# Patient Record
Sex: FEMALE | Race: OTHER | NOT HISPANIC OR LATINO
[De-identification: names, ages, dates, MRNs, and addresses within clinical notes are randomized per-mention and may not be internally consistent; named-entity substitution may affect disease eponyms.]

---

## 2017-07-17 ENCOUNTER — RECORD ABSTRACTING (OUTPATIENT)
Age: 22
End: 2017-07-17

## 2017-07-17 PROBLEM — Z00.00 ENCOUNTER FOR PREVENTIVE HEALTH EXAMINATION: Status: ACTIVE | Noted: 2017-07-17

## 2018-02-06 ENCOUNTER — APPOINTMENT (OUTPATIENT)
Dept: ANTEPARTUM | Facility: CLINIC | Age: 23
End: 2018-02-06
Payer: MEDICAID

## 2018-02-06 ENCOUNTER — ASOB RESULT (OUTPATIENT)
Age: 23
End: 2018-02-06

## 2018-02-06 PROCEDURE — 76817 TRANSVAGINAL US OBSTETRIC: CPT

## 2018-02-06 PROCEDURE — 76801 OB US < 14 WKS SINGLE FETUS: CPT

## 2018-02-14 DIAGNOSIS — Z78.9 OTHER SPECIFIED HEALTH STATUS: ICD-10-CM

## 2018-02-14 DIAGNOSIS — A74.9 CHLAMYDIAL INFECTION, UNSPECIFIED: ICD-10-CM

## 2018-02-14 DIAGNOSIS — Z87.59 PERSONAL HISTORY OF OTHER COMPLICATIONS OF PREGNANCY, CHILDBIRTH AND THE PUERPERIUM: ICD-10-CM

## 2018-02-14 DIAGNOSIS — Z87.440 PERSONAL HISTORY OF URINARY (TRACT) INFECTIONS: ICD-10-CM

## 2018-02-16 ENCOUNTER — APPOINTMENT (OUTPATIENT)
Dept: MATERNAL FETAL MEDICINE | Facility: CLINIC | Age: 23
End: 2018-02-16
Payer: MEDICAID

## 2018-02-16 ENCOUNTER — APPOINTMENT (OUTPATIENT)
Dept: ANTEPARTUM | Facility: CLINIC | Age: 23
End: 2018-02-16

## 2018-02-16 VITALS
HEART RATE: 97 BPM | RESPIRATION RATE: 16 BRPM | SYSTOLIC BLOOD PRESSURE: 92 MMHG | HEIGHT: 62 IN | BODY MASS INDEX: 22.92 KG/M2 | DIASTOLIC BLOOD PRESSURE: 56 MMHG | WEIGHT: 124.56 LBS | OXYGEN SATURATION: 98 %

## 2018-02-16 DIAGNOSIS — Z87.440 SUPERVISION OF OTHER HIGH RISK PREGNANCIES, UNSPECIFIED TRIMESTER: ICD-10-CM

## 2018-02-16 DIAGNOSIS — O09.899 SUPERVISION OF OTHER HIGH RISK PREGNANCIES, UNSPECIFIED TRIMESTER: ICD-10-CM

## 2018-02-16 PROCEDURE — 99243 OFF/OP CNSLTJ NEW/EST LOW 30: CPT

## 2018-02-16 RX ORDER — KETOROLAC TROMETHAMINE 10 MG/1
10 TABLET, FILM COATED ORAL
Qty: 20 | Refills: 0 | Status: DISCONTINUED | COMMUNITY
Start: 2017-08-24

## 2018-02-19 LAB
ALBUMIN SERPL ELPH-MCNC: 4 G/DL
ALP BLD-CCNC: 59 U/L
ALT SERPL-CCNC: 22 U/L
ANION GAP SERPL CALC-SCNC: 12 MMOL/L
AST SERPL-CCNC: 33 U/L
BILIRUB SERPL-MCNC: <0.2 MG/DL
BUN SERPL-MCNC: 10 MG/DL
CALCIUM SERPL-MCNC: 9.7 MG/DL
CHLORIDE SERPL-SCNC: 103 MMOL/L
CO2 SERPL-SCNC: 23 MMOL/L
CREAT SERPL-MCNC: 0.76 MG/DL
GLUCOSE SERPL-MCNC: 124 MG/DL
HBA1C MFR BLD HPLC: 12.3 %
POTASSIUM SERPL-SCNC: 5 MMOL/L
PROT SERPL-MCNC: 6.8 G/DL
SODIUM SERPL-SCNC: 138 MMOL/L

## 2018-02-20 ENCOUNTER — APPOINTMENT (OUTPATIENT)
Dept: MATERNAL FETAL MEDICINE | Facility: CLINIC | Age: 23
End: 2018-02-20
Payer: MEDICAID

## 2018-02-20 ENCOUNTER — ASOB RESULT (OUTPATIENT)
Age: 23
End: 2018-02-20

## 2018-02-20 VITALS — HEIGHT: 62 IN | WEIGHT: 129 LBS | BODY MASS INDEX: 23.74 KG/M2

## 2018-02-20 LAB
CREAT SPEC-SCNC: 93 MG/DL
CREAT/PROT UR: 0.1 RATIO
PROT UR-MCNC: 5 MG/DL

## 2018-02-20 PROCEDURE — G0108 DIAB MANAGE TRN  PER INDIV: CPT

## 2018-02-20 RX ORDER — BLOOD SUGAR DIAGNOSTIC
STRIP MISCELLANEOUS
Qty: 100 | Refills: 0 | Status: DISCONTINUED | COMMUNITY
Start: 2018-02-05 | End: 2018-02-20

## 2018-02-23 LAB
CREAT 24H UR-MCNC: 1.3 G/24 H
CREAT 24H UR-MCNC: 1.3 G/24 H
CREAT ?TM UR-MCNC: 61 MG/DL
CREAT ?TM UR-MCNC: 61 MG/DL
PROT 24H UR-MRATE: <4 MG/DL
PROT ?TM UR-MCNC: 24 HR
PROT ?TM UR-MCNC: 24 HR
PROT UR-MCNC: NORMAL MG/24 H
SPECIMEN VOL 24H UR: 2050 ML
SPECIMEN VOL 24H UR: 2050 ML

## 2018-03-01 ENCOUNTER — APPOINTMENT (OUTPATIENT)
Dept: MATERNAL FETAL MEDICINE | Facility: CLINIC | Age: 23
End: 2018-03-01

## 2018-03-06 ENCOUNTER — APPOINTMENT (OUTPATIENT)
Dept: MATERNAL FETAL MEDICINE | Facility: CLINIC | Age: 23
End: 2018-03-06

## 2018-03-15 ENCOUNTER — APPOINTMENT (OUTPATIENT)
Dept: MATERNAL FETAL MEDICINE | Facility: CLINIC | Age: 23
End: 2018-03-15
Payer: MEDICAID

## 2018-03-15 ENCOUNTER — RX RENEWAL (OUTPATIENT)
Age: 23
End: 2018-03-15

## 2018-03-15 ENCOUNTER — ASOB RESULT (OUTPATIENT)
Age: 23
End: 2018-03-15

## 2018-03-15 ENCOUNTER — OTHER (OUTPATIENT)
Age: 23
End: 2018-03-15

## 2018-03-15 PROCEDURE — 99241 OFFICE CONSULTATION NEW/ESTAB PATIENT 15 MIN: CPT

## 2018-03-20 ENCOUNTER — ASOB RESULT (OUTPATIENT)
Age: 23
End: 2018-03-20

## 2018-03-20 ENCOUNTER — APPOINTMENT (OUTPATIENT)
Dept: ANTEPARTUM | Facility: CLINIC | Age: 23
End: 2018-03-20
Payer: MEDICAID

## 2018-03-20 PROCEDURE — 76801 OB US < 14 WKS SINGLE FETUS: CPT

## 2018-03-20 PROCEDURE — 36416 COLLJ CAPILLARY BLOOD SPEC: CPT

## 2018-03-20 PROCEDURE — 76813 OB US NUCHAL MEAS 1 GEST: CPT

## 2018-03-26 LAB
1ST TRIMESTER DATA: NORMAL
ADDENDUM DOC: NORMAL
AFP PNL SERPL: NORMAL
AFP SERPL-ACNC: NORMAL
CLINICAL BIOCHEMIST REVIEW: NORMAL
FREE BETA HCG 1ST TRIMESTER: NORMAL
Lab: NORMAL
NASAL BONE: PRESENT
NOTES NTD: NORMAL
NT: NORMAL
PAPP-A SERPL-ACNC: NORMAL
TRISOMY 18/3: NORMAL

## 2018-03-27 ENCOUNTER — ASOB RESULT (OUTPATIENT)
Age: 23
End: 2018-03-27

## 2018-03-27 ENCOUNTER — APPOINTMENT (OUTPATIENT)
Dept: MATERNAL FETAL MEDICINE | Facility: CLINIC | Age: 23
End: 2018-03-27

## 2018-04-03 ENCOUNTER — APPOINTMENT (OUTPATIENT)
Dept: ANTEPARTUM | Facility: CLINIC | Age: 23
End: 2018-04-03

## 2018-04-10 ENCOUNTER — APPOINTMENT (OUTPATIENT)
Dept: MATERNAL FETAL MEDICINE | Facility: CLINIC | Age: 23
End: 2018-04-10
Payer: MEDICAID

## 2018-04-10 ENCOUNTER — ASOB RESULT (OUTPATIENT)
Age: 23
End: 2018-04-10

## 2018-04-10 DIAGNOSIS — O24.011 PRE-EXISTING TYPE 1 DIABETES MELLITUS, IN PREGNANCY, FIRST TRIMESTER: ICD-10-CM

## 2018-04-10 PROCEDURE — G0108 DIAB MANAGE TRN  PER INDIV: CPT

## 2018-04-11 ENCOUNTER — ASOB RESULT (OUTPATIENT)
Age: 23
End: 2018-04-11

## 2018-04-11 VITALS — HEIGHT: 62 IN | WEIGHT: 142.9 LBS | BODY MASS INDEX: 26.3 KG/M2

## 2018-04-11 PROBLEM — O24.011 TYPE 1 DIABETES MELLITUS DURING PREGNANCY IN FIRST TRIMESTER: Noted: 2018-02-16

## 2018-04-17 ENCOUNTER — ASOB RESULT (OUTPATIENT)
Age: 23
End: 2018-04-17

## 2018-04-17 ENCOUNTER — APPOINTMENT (OUTPATIENT)
Dept: MATERNAL FETAL MEDICINE | Facility: CLINIC | Age: 23
End: 2018-04-17
Payer: MEDICAID

## 2018-04-17 VITALS — HEIGHT: 62 IN | BODY MASS INDEX: 26.87 KG/M2 | WEIGHT: 146 LBS

## 2018-04-17 PROCEDURE — G0108 DIAB MANAGE TRN  PER INDIV: CPT

## 2018-05-01 ENCOUNTER — APPOINTMENT (OUTPATIENT)
Dept: ANTEPARTUM | Facility: CLINIC | Age: 23
End: 2018-05-01
Payer: MEDICAID

## 2018-05-01 ENCOUNTER — APPOINTMENT (OUTPATIENT)
Dept: MATERNAL FETAL MEDICINE | Facility: CLINIC | Age: 23
End: 2018-05-01
Payer: MEDICAID

## 2018-05-01 ENCOUNTER — ASOB RESULT (OUTPATIENT)
Age: 23
End: 2018-05-01

## 2018-05-01 VITALS
SYSTOLIC BLOOD PRESSURE: 90 MMHG | DIASTOLIC BLOOD PRESSURE: 56 MMHG | OXYGEN SATURATION: 98 % | RESPIRATION RATE: 16 BRPM | WEIGHT: 147.5 LBS

## 2018-05-01 DIAGNOSIS — O99.89 OTHER SPECIFIED DISEASES AND CONDITIONS COMPLICATING PREGNANCY, CHILDBIRTH AND THE PUERPERIUM: ICD-10-CM

## 2018-05-01 DIAGNOSIS — Z22.330 OTHER SPECIFIED DISEASES AND CONDITIONS COMPLICATING PREGNANCY, CHILDBIRTH AND THE PUERPERIUM: ICD-10-CM

## 2018-05-01 PROCEDURE — 76811 OB US DETAILED SNGL FETUS: CPT

## 2018-05-01 PROCEDURE — 99213 OFFICE O/P EST LOW 20 MIN: CPT

## 2018-05-15 ENCOUNTER — ASOB RESULT (OUTPATIENT)
Age: 23
End: 2018-05-15

## 2018-05-15 ENCOUNTER — APPOINTMENT (OUTPATIENT)
Dept: MATERNAL FETAL MEDICINE | Facility: CLINIC | Age: 23
End: 2018-05-15
Payer: MEDICAID

## 2018-05-15 PROCEDURE — G0108 DIAB MANAGE TRN  PER INDIV: CPT

## 2018-05-16 LAB — HBA1C MFR BLD HPLC: 7.2 %

## 2018-05-18 ENCOUNTER — MEDICATION RENEWAL (OUTPATIENT)
Age: 23
End: 2018-05-18

## 2018-05-21 ENCOUNTER — APPOINTMENT (OUTPATIENT)
Dept: MATERNAL FETAL MEDICINE | Facility: CLINIC | Age: 23
End: 2018-05-21

## 2018-06-04 ENCOUNTER — APPOINTMENT (OUTPATIENT)
Dept: PEDIATRIC CARDIOLOGY | Facility: CLINIC | Age: 23
End: 2018-06-04
Payer: MEDICAID

## 2018-06-04 PROCEDURE — 99203 OFFICE O/P NEW LOW 30 MIN: CPT | Mod: 25

## 2018-06-04 PROCEDURE — 76820 UMBILICAL ARTERY ECHO: CPT

## 2018-06-04 PROCEDURE — 93325 DOPPLER ECHO COLOR FLOW MAPG: CPT | Mod: 59

## 2018-06-04 PROCEDURE — 76821 MIDDLE CEREBRAL ARTERY ECHO: CPT

## 2018-06-04 PROCEDURE — 76825 ECHO EXAM OF FETAL HEART: CPT

## 2018-06-04 PROCEDURE — 76827 ECHO EXAM OF FETAL HEART: CPT

## 2018-06-04 RX ORDER — PNV 119/IRON FUM/FOLIC ACID 29 MG-1 MG
TABLET ORAL
Qty: 30 | Refills: 0 | Status: ACTIVE | COMMUNITY
Start: 2018-03-15

## 2018-06-04 RX ORDER — METRONIDAZOLE 500 MG/1
500 TABLET ORAL
Qty: 14 | Refills: 0 | Status: COMPLETED | COMMUNITY
Start: 2018-03-24

## 2018-06-05 ENCOUNTER — APPOINTMENT (OUTPATIENT)
Dept: ANTEPARTUM | Facility: CLINIC | Age: 23
End: 2018-06-05
Payer: MEDICAID

## 2018-06-05 ENCOUNTER — ASOB RESULT (OUTPATIENT)
Age: 23
End: 2018-06-05

## 2018-06-05 ENCOUNTER — APPOINTMENT (OUTPATIENT)
Dept: MATERNAL FETAL MEDICINE | Facility: CLINIC | Age: 23
End: 2018-06-05
Payer: MEDICAID

## 2018-06-05 VITALS
WEIGHT: 156.31 LBS | SYSTOLIC BLOOD PRESSURE: 98 MMHG | BODY MASS INDEX: 28.76 KG/M2 | DIASTOLIC BLOOD PRESSURE: 56 MMHG | HEIGHT: 62 IN

## 2018-06-05 PROCEDURE — 76816 OB US FOLLOW-UP PER FETUS: CPT

## 2018-06-05 PROCEDURE — 99214 OFFICE O/P EST MOD 30 MIN: CPT

## 2018-06-06 ENCOUNTER — APPOINTMENT (OUTPATIENT)
Dept: MATERNAL FETAL MEDICINE | Facility: CLINIC | Age: 23
End: 2018-06-06

## 2018-06-29 ENCOUNTER — OTHER (OUTPATIENT)
Age: 23
End: 2018-06-29

## 2018-06-29 RX ORDER — ISOPROPYL ALCOHOL 70 ML/100ML
SWAB TOPICAL
Qty: 2 | Refills: 3 | Status: ACTIVE | COMMUNITY
Start: 2018-05-15 | End: 1900-01-01

## 2018-06-29 RX ORDER — LANCING DEVICE
EACH MISCELLANEOUS
Qty: 2 | Refills: 0 | Status: ACTIVE | COMMUNITY
Start: 2018-02-05 | End: 1900-01-01

## 2018-07-03 ENCOUNTER — ASOB RESULT (OUTPATIENT)
Age: 23
End: 2018-07-03

## 2018-07-03 ENCOUNTER — APPOINTMENT (OUTPATIENT)
Dept: ANTEPARTUM | Facility: CLINIC | Age: 23
End: 2018-07-03
Payer: MEDICAID

## 2018-07-03 ENCOUNTER — APPOINTMENT (OUTPATIENT)
Dept: MATERNAL FETAL MEDICINE | Facility: CLINIC | Age: 23
End: 2018-07-03
Payer: MEDICAID

## 2018-07-03 ENCOUNTER — APPOINTMENT (OUTPATIENT)
Dept: PEDIATRIC CARDIOLOGY | Facility: CLINIC | Age: 23
End: 2018-07-03
Payer: MEDICAID

## 2018-07-03 VITALS
HEIGHT: 62 IN | WEIGHT: 165 LBS | DIASTOLIC BLOOD PRESSURE: 62 MMHG | SYSTOLIC BLOOD PRESSURE: 100 MMHG | HEART RATE: 88 BPM | BODY MASS INDEX: 30.36 KG/M2

## 2018-07-03 DIAGNOSIS — O24.012 PRE-EXISTING TYPE 1 DIABETES MELLITUS, IN PREGNANCY, SECOND TRIMESTER: ICD-10-CM

## 2018-07-03 DIAGNOSIS — O35.9XX0 MATERNAL CARE FOR (SUSPECTED) FETAL ABNORMALITY AND DAMAGE, UNSPECIFIED, NOT APPLICABLE OR UNSPECIFIED: ICD-10-CM

## 2018-07-03 DIAGNOSIS — Z3A.08 8 WEEKS GESTATION OF PREGNANCY: ICD-10-CM

## 2018-07-03 DIAGNOSIS — Z3A.24 24 WEEKS GESTATION OF PREGNANCY: ICD-10-CM

## 2018-07-03 PROCEDURE — 76820 UMBILICAL ARTERY ECHO: CPT

## 2018-07-03 PROCEDURE — 93976 VASCULAR STUDY: CPT

## 2018-07-03 PROCEDURE — 93325 DOPPLER ECHO COLOR FLOW MAPG: CPT | Mod: 59

## 2018-07-03 PROCEDURE — 99214 OFFICE O/P EST MOD 30 MIN: CPT | Mod: 25

## 2018-07-03 PROCEDURE — 76816 OB US FOLLOW-UP PER FETUS: CPT

## 2018-07-03 PROCEDURE — 99213 OFFICE O/P EST LOW 20 MIN: CPT

## 2018-07-03 PROCEDURE — 76827 ECHO EXAM OF FETAL HEART: CPT

## 2018-07-03 PROCEDURE — 76825 ECHO EXAM OF FETAL HEART: CPT

## 2018-07-03 PROCEDURE — 76819 FETAL BIOPHYS PROFIL W/O NST: CPT

## 2018-07-03 PROCEDURE — 76821 MIDDLE CEREBRAL ARTERY ECHO: CPT

## 2018-07-03 RX ORDER — IBUPROFEN 400 MG/1
400 TABLET, FILM COATED ORAL
Qty: 21 | Refills: 0 | Status: DISCONTINUED | COMMUNITY
Start: 2018-06-14

## 2018-07-03 RX ORDER — AMOXICILLIN 500 MG/1
500 CAPSULE ORAL
Qty: 42 | Refills: 0 | Status: COMPLETED | COMMUNITY
Start: 2018-06-14

## 2018-07-26 ENCOUNTER — APPOINTMENT (OUTPATIENT)
Dept: MATERNAL FETAL MEDICINE | Facility: CLINIC | Age: 23
End: 2018-07-26

## 2018-07-26 ENCOUNTER — ASOB RESULT (OUTPATIENT)
Age: 23
End: 2018-07-26

## 2018-07-28 VITALS — WEIGHT: 172.06 LBS | BODY MASS INDEX: 31.66 KG/M2 | HEIGHT: 62 IN

## 2018-07-31 ENCOUNTER — ASOB RESULT (OUTPATIENT)
Age: 23
End: 2018-07-31

## 2018-07-31 ENCOUNTER — APPOINTMENT (OUTPATIENT)
Dept: ANTEPARTUM | Facility: CLINIC | Age: 23
End: 2018-07-31
Payer: MEDICAID

## 2018-07-31 PROCEDURE — 76816 OB US FOLLOW-UP PER FETUS: CPT

## 2018-07-31 PROCEDURE — 76819 FETAL BIOPHYS PROFIL W/O NST: CPT

## 2018-07-31 PROCEDURE — 93976 VASCULAR STUDY: CPT

## 2018-07-31 PROCEDURE — 76820 UMBILICAL ARTERY ECHO: CPT

## 2018-08-07 ENCOUNTER — APPOINTMENT (OUTPATIENT)
Dept: PEDIATRIC CARDIOLOGY | Facility: CLINIC | Age: 23
End: 2018-08-07

## 2018-08-10 ENCOUNTER — APPOINTMENT (OUTPATIENT)
Dept: MATERNAL FETAL MEDICINE | Facility: CLINIC | Age: 23
End: 2018-08-10
Payer: MEDICAID

## 2018-08-10 ENCOUNTER — APPOINTMENT (OUTPATIENT)
Dept: ANTEPARTUM | Facility: CLINIC | Age: 23
End: 2018-08-10
Payer: MEDICAID

## 2018-08-10 ENCOUNTER — ASOB RESULT (OUTPATIENT)
Age: 23
End: 2018-08-10

## 2018-08-10 DIAGNOSIS — Z3A.33 33 WEEKS GESTATION OF PREGNANCY: ICD-10-CM

## 2018-08-10 PROCEDURE — 99214 OFFICE O/P EST MOD 30 MIN: CPT

## 2018-08-10 PROCEDURE — 76815 OB US LIMITED FETUS(S): CPT

## 2018-08-10 PROCEDURE — 76818 FETAL BIOPHYS PROFILE W/NST: CPT

## 2018-08-10 RX ORDER — INSULIN LISPRO 100 [IU]/ML
100 INJECTION, SOLUTION INTRAVENOUS; SUBCUTANEOUS
Qty: 3 | Refills: 2 | Status: ACTIVE | COMMUNITY
Start: 2018-02-05

## 2018-08-13 ENCOUNTER — APPOINTMENT (OUTPATIENT)
Dept: ANTEPARTUM | Facility: CLINIC | Age: 23
End: 2018-08-13

## 2018-08-13 LAB — HBA1C MFR BLD HPLC: 8 %

## 2018-08-14 ENCOUNTER — APPOINTMENT (OUTPATIENT)
Dept: ANTEPARTUM | Facility: CLINIC | Age: 23
End: 2018-08-14
Payer: MEDICAID

## 2018-08-14 ENCOUNTER — ASOB RESULT (OUTPATIENT)
Age: 23
End: 2018-08-14

## 2018-08-14 PROCEDURE — 76818 FETAL BIOPHYS PROFILE W/NST: CPT

## 2018-08-14 PROCEDURE — 76815 OB US LIMITED FETUS(S): CPT

## 2018-08-16 ENCOUNTER — APPOINTMENT (OUTPATIENT)
Dept: MATERNAL FETAL MEDICINE | Facility: CLINIC | Age: 23
End: 2018-08-16

## 2018-08-16 ENCOUNTER — APPOINTMENT (OUTPATIENT)
Dept: ANTEPARTUM | Facility: CLINIC | Age: 23
End: 2018-08-16

## 2018-08-16 ENCOUNTER — ASOB RESULT (OUTPATIENT)
Age: 23
End: 2018-08-16

## 2018-08-16 ENCOUNTER — APPOINTMENT (OUTPATIENT)
Dept: ANTEPARTUM | Facility: CLINIC | Age: 23
End: 2018-08-16
Payer: MEDICAID

## 2018-08-16 DIAGNOSIS — Z3A.28 28 WEEKS GESTATION OF PREGNANCY: ICD-10-CM

## 2018-08-16 PROCEDURE — 93976 VASCULAR STUDY: CPT

## 2018-08-16 PROCEDURE — 76821 MIDDLE CEREBRAL ARTERY ECHO: CPT

## 2018-08-16 PROCEDURE — 76818 FETAL BIOPHYS PROFILE W/NST: CPT

## 2018-08-16 PROCEDURE — 76820 UMBILICAL ARTERY ECHO: CPT

## 2018-08-17 ENCOUNTER — APPOINTMENT (OUTPATIENT)
Dept: ANTEPARTUM | Facility: CLINIC | Age: 23
End: 2018-08-17

## 2018-08-17 ENCOUNTER — APPOINTMENT (OUTPATIENT)
Dept: PEDIATRIC CARDIOLOGY | Facility: CLINIC | Age: 23
End: 2018-08-17
Payer: MEDICAID

## 2018-08-17 DIAGNOSIS — O35.8XX0 MATERNAL CARE FOR OTHER (SUSPECTED) FETAL ABNORMALITY AND DAMAGE, NOT APPLICABLE OR UNSPECIFIED: ICD-10-CM

## 2018-08-17 DIAGNOSIS — Z3A.34 34 WEEKS GESTATION OF PREGNANCY: ICD-10-CM

## 2018-08-17 DIAGNOSIS — Z82.49 FAMILY HISTORY OF ISCHEMIC HEART DISEASE AND OTHER DISEASES OF THE CIRCULATORY SYSTEM: ICD-10-CM

## 2018-08-17 PROCEDURE — 76820 UMBILICAL ARTERY ECHO: CPT

## 2018-08-17 PROCEDURE — 76821 MIDDLE CEREBRAL ARTERY ECHO: CPT

## 2018-08-17 PROCEDURE — 76827 ECHO EXAM OF FETAL HEART: CPT

## 2018-08-17 PROCEDURE — 76825 ECHO EXAM OF FETAL HEART: CPT

## 2018-08-17 PROCEDURE — 93325 DOPPLER ECHO COLOR FLOW MAPG: CPT | Mod: 59

## 2018-08-17 PROCEDURE — 99214 OFFICE O/P EST MOD 30 MIN: CPT | Mod: 25

## 2018-08-21 ENCOUNTER — APPOINTMENT (OUTPATIENT)
Dept: MATERNAL FETAL MEDICINE | Facility: CLINIC | Age: 23
End: 2018-08-21
Payer: MEDICAID

## 2018-08-21 ENCOUNTER — ASOB RESULT (OUTPATIENT)
Age: 23
End: 2018-08-21

## 2018-08-21 ENCOUNTER — APPOINTMENT (OUTPATIENT)
Dept: ANTEPARTUM | Facility: CLINIC | Age: 23
End: 2018-08-21
Payer: MEDICAID

## 2018-08-21 VITALS — HEIGHT: 62 IN | BODY MASS INDEX: 33.58 KG/M2 | WEIGHT: 182.5 LBS

## 2018-08-21 DIAGNOSIS — E10.65 TYPE 1 DIABETES MELLITUS WITH HYPERGLYCEMIA: ICD-10-CM

## 2018-08-21 DIAGNOSIS — O24.013 PRE-EXISTING TYPE 1 DIABETES MELLITUS, IN PREGNANCY, THIRD TRIMESTER: ICD-10-CM

## 2018-08-21 PROCEDURE — 76815 OB US LIMITED FETUS(S): CPT | Mod: 59

## 2018-08-21 PROCEDURE — G0108 DIAB MANAGE TRN  PER INDIV: CPT

## 2018-08-21 PROCEDURE — 76820 UMBILICAL ARTERY ECHO: CPT

## 2018-08-21 PROCEDURE — 93976 VASCULAR STUDY: CPT

## 2018-08-21 PROCEDURE — 76821 MIDDLE CEREBRAL ARTERY ECHO: CPT

## 2018-08-21 PROCEDURE — 76818 FETAL BIOPHYS PROFILE W/NST: CPT

## 2018-08-24 ENCOUNTER — APPOINTMENT (OUTPATIENT)
Dept: ANTEPARTUM | Facility: CLINIC | Age: 23
End: 2018-08-24
Payer: MEDICAID

## 2018-08-24 ENCOUNTER — ASOB RESULT (OUTPATIENT)
Age: 23
End: 2018-08-24

## 2018-08-24 PROCEDURE — 93976 VASCULAR STUDY: CPT

## 2018-08-24 PROCEDURE — 76820 UMBILICAL ARTERY ECHO: CPT

## 2018-08-24 PROCEDURE — 76818 FETAL BIOPHYS PROFILE W/NST: CPT

## 2018-08-24 PROCEDURE — 76821 MIDDLE CEREBRAL ARTERY ECHO: CPT

## 2018-08-24 PROCEDURE — 76816 OB US FOLLOW-UP PER FETUS: CPT

## 2018-08-28 ENCOUNTER — APPOINTMENT (OUTPATIENT)
Dept: ANTEPARTUM | Facility: CLINIC | Age: 23
End: 2018-08-28
Payer: MEDICAID

## 2018-08-28 ENCOUNTER — ASOB RESULT (OUTPATIENT)
Age: 23
End: 2018-08-28

## 2018-08-28 PROCEDURE — 99214 OFFICE O/P EST MOD 30 MIN: CPT | Mod: 25,TH

## 2018-08-28 PROCEDURE — 76820 UMBILICAL ARTERY ECHO: CPT

## 2018-08-28 PROCEDURE — 76818 FETAL BIOPHYS PROFILE W/NST: CPT

## 2018-08-28 PROCEDURE — 93976 VASCULAR STUDY: CPT

## 2018-08-28 PROCEDURE — 76815 OB US LIMITED FETUS(S): CPT

## 2018-08-30 ENCOUNTER — INPATIENT (INPATIENT)
Facility: HOSPITAL | Age: 23
LOS: 6 days | Discharge: HOME | End: 2018-09-06
Attending: OBSTETRICS & GYNECOLOGY | Admitting: OBSTETRICS & GYNECOLOGY
Payer: COMMERCIAL

## 2018-08-30 ENCOUNTER — ASOB RESULT (OUTPATIENT)
Age: 23
End: 2018-08-30

## 2018-08-30 ENCOUNTER — APPOINTMENT (OUTPATIENT)
Dept: ANTEPARTUM | Facility: CLINIC | Age: 23
End: 2018-08-30

## 2018-08-30 ENCOUNTER — APPOINTMENT (OUTPATIENT)
Dept: ANTEPARTUM | Facility: CLINIC | Age: 23
End: 2018-08-30
Payer: MEDICAID

## 2018-08-30 VITALS — WEIGHT: 185.19 LBS | HEIGHT: 63 IN

## 2018-08-30 DIAGNOSIS — O47.03 FALSE LABOR BEFORE 37 COMPLETED WEEKS OF GESTATION, THIRD TRIMESTER: ICD-10-CM

## 2018-08-30 LAB
ABO RH CONFIRMATION: SIGNIFICANT CHANGE UP
ALBUMIN SERPL ELPH-MCNC: 3.2 G/DL — LOW (ref 3.3–5.2)
ALP SERPL-CCNC: 179 U/L — HIGH (ref 40–120)
ALT FLD-CCNC: 7 U/L — SIGNIFICANT CHANGE UP
AMPHET UR-MCNC: NEGATIVE — SIGNIFICANT CHANGE UP
ANION GAP SERPL CALC-SCNC: 13 MMOL/L — SIGNIFICANT CHANGE UP (ref 5–17)
ANISOCYTOSIS BLD QL: SLIGHT — SIGNIFICANT CHANGE UP
APPEARANCE UR: CLEAR — SIGNIFICANT CHANGE UP
AST SERPL-CCNC: 27 U/L — SIGNIFICANT CHANGE UP
BARBITURATES UR SCN-MCNC: NEGATIVE — SIGNIFICANT CHANGE UP
BASOPHILS # BLD AUTO: 0 K/UL — SIGNIFICANT CHANGE UP (ref 0–0.2)
BASOPHILS NFR BLD AUTO: 0.2 % — SIGNIFICANT CHANGE UP (ref 0–2)
BENZODIAZ UR-MCNC: NEGATIVE — SIGNIFICANT CHANGE UP
BILIRUB SERPL-MCNC: <0.2 MG/DL — LOW (ref 0.4–2)
BILIRUB UR-MCNC: NEGATIVE — SIGNIFICANT CHANGE UP
BLD GP AB SCN SERPL QL: SIGNIFICANT CHANGE UP
BUN SERPL-MCNC: 8 MG/DL — SIGNIFICANT CHANGE UP (ref 8–20)
CALCIUM SERPL-MCNC: 9.3 MG/DL — SIGNIFICANT CHANGE UP (ref 8.6–10.2)
CHLORIDE SERPL-SCNC: 105 MMOL/L — SIGNIFICANT CHANGE UP (ref 98–107)
CO2 SERPL-SCNC: 21 MMOL/L — LOW (ref 22–29)
COCAINE METAB.OTHER UR-MCNC: NEGATIVE — SIGNIFICANT CHANGE UP
COLOR SPEC: YELLOW — SIGNIFICANT CHANGE UP
CREAT SERPL-MCNC: 0.72 MG/DL — SIGNIFICANT CHANGE UP (ref 0.5–1.3)
DIFF PNL FLD: NEGATIVE — SIGNIFICANT CHANGE UP
EOSINOPHIL # BLD AUTO: 0.1 K/UL — SIGNIFICANT CHANGE UP (ref 0–0.5)
EOSINOPHIL NFR BLD AUTO: 0.8 % — SIGNIFICANT CHANGE UP (ref 0–6)
GLUCOSE BLDC GLUCOMTR-MCNC: 101 MG/DL — HIGH (ref 70–99)
GLUCOSE BLDC GLUCOMTR-MCNC: 117 MG/DL — HIGH (ref 70–99)
GLUCOSE BLDC GLUCOMTR-MCNC: 159 MG/DL — HIGH (ref 70–99)
GLUCOSE BLDC GLUCOMTR-MCNC: 200 MG/DL — HIGH (ref 70–99)
GLUCOSE BLDC GLUCOMTR-MCNC: 57 MG/DL — LOW (ref 70–99)
GLUCOSE SERPL-MCNC: 48 MG/DL — CRITICAL LOW (ref 70–115)
GLUCOSE UR QL: NEGATIVE MG/DL — SIGNIFICANT CHANGE UP
HCT VFR BLD CALC: 38.1 % — SIGNIFICANT CHANGE UP (ref 37–47)
HGB BLD-MCNC: 12.5 G/DL — SIGNIFICANT CHANGE UP (ref 12–16)
HIV 1 & 2 AB SERPL IA.RAPID: SIGNIFICANT CHANGE UP
KETONES UR-MCNC: NEGATIVE — SIGNIFICANT CHANGE UP
LEUKOCYTE ESTERASE UR-ACNC: NEGATIVE — SIGNIFICANT CHANGE UP
LYMPHOCYTES # BLD AUTO: 2.6 K/UL — SIGNIFICANT CHANGE UP (ref 1–4.8)
LYMPHOCYTES # BLD AUTO: 23.8 % — SIGNIFICANT CHANGE UP (ref 20–55)
MCHC RBC-ENTMCNC: 26.9 PG — LOW (ref 27–31)
MCHC RBC-ENTMCNC: 32.8 G/DL — SIGNIFICANT CHANGE UP (ref 32–36)
MCV RBC AUTO: 82.1 FL — SIGNIFICANT CHANGE UP (ref 81–99)
METHADONE UR-MCNC: NEGATIVE — SIGNIFICANT CHANGE UP
MONOCYTES # BLD AUTO: 0.7 K/UL — SIGNIFICANT CHANGE UP (ref 0–0.8)
MONOCYTES NFR BLD AUTO: 6.4 % — SIGNIFICANT CHANGE UP (ref 3–10)
NEUTROPHILS # BLD AUTO: 7.5 K/UL — SIGNIFICANT CHANGE UP (ref 1.8–8)
NEUTROPHILS NFR BLD AUTO: 67.5 % — SIGNIFICANT CHANGE UP (ref 37–73)
NITRITE UR-MCNC: NEGATIVE — SIGNIFICANT CHANGE UP
OPIATES UR-MCNC: NEGATIVE — SIGNIFICANT CHANGE UP
PCP SPEC-MCNC: SIGNIFICANT CHANGE UP
PCP UR-MCNC: NEGATIVE — SIGNIFICANT CHANGE UP
PH UR: 7 — SIGNIFICANT CHANGE UP (ref 5–8)
PLAT MORPH BLD: NORMAL — SIGNIFICANT CHANGE UP
PLATELET # BLD AUTO: 162 K/UL — SIGNIFICANT CHANGE UP (ref 150–400)
POIKILOCYTOSIS BLD QL AUTO: SLIGHT — SIGNIFICANT CHANGE UP
POTASSIUM SERPL-MCNC: 4.2 MMOL/L — SIGNIFICANT CHANGE UP (ref 3.5–5.3)
POTASSIUM SERPL-SCNC: 4.2 MMOL/L — SIGNIFICANT CHANGE UP (ref 3.5–5.3)
PROT SERPL-MCNC: 6.8 G/DL — SIGNIFICANT CHANGE UP (ref 6.6–8.7)
PROT UR-MCNC: NEGATIVE MG/DL — SIGNIFICANT CHANGE UP
RBC # BLD: 4.64 M/UL — SIGNIFICANT CHANGE UP (ref 4.4–5.2)
RBC # FLD: 13 % — SIGNIFICANT CHANGE UP (ref 11–15.6)
RBC BLD AUTO: ABNORMAL
SODIUM SERPL-SCNC: 139 MMOL/L — SIGNIFICANT CHANGE UP (ref 135–145)
SP GR SPEC: 1 — LOW (ref 1.01–1.02)
THC UR QL: NEGATIVE — SIGNIFICANT CHANGE UP
TYPE + AB SCN PNL BLD: SIGNIFICANT CHANGE UP
UROBILINOGEN FLD QL: NEGATIVE MG/DL — SIGNIFICANT CHANGE UP
WBC # BLD: 11.1 K/UL — HIGH (ref 4.8–10.8)
WBC # FLD AUTO: 11.1 K/UL — HIGH (ref 4.8–10.8)

## 2018-08-30 PROCEDURE — 76815 OB US LIMITED FETUS(S): CPT

## 2018-08-30 PROCEDURE — 76818 FETAL BIOPHYS PROFILE W/NST: CPT

## 2018-08-30 PROCEDURE — 93976 VASCULAR STUDY: CPT

## 2018-08-30 PROCEDURE — 76820 UMBILICAL ARTERY ECHO: CPT

## 2018-08-30 RX ORDER — HUMAN INSULIN 100 [IU]/ML
52 INJECTION, SUSPENSION SUBCUTANEOUS AT BEDTIME
Qty: 0 | Refills: 0 | Status: DISCONTINUED | OUTPATIENT
Start: 2018-08-30 | End: 2018-08-30

## 2018-08-30 RX ORDER — DEXTROSE 50 % IN WATER 50 %
15 SYRINGE (ML) INTRAVENOUS ONCE
Qty: 0 | Refills: 0 | Status: DISCONTINUED | OUTPATIENT
Start: 2018-08-30 | End: 2018-08-31

## 2018-08-30 RX ORDER — GLUCAGON INJECTION, SOLUTION 0.5 MG/.1ML
1 INJECTION, SOLUTION SUBCUTANEOUS ONCE
Qty: 0 | Refills: 0 | Status: DISCONTINUED | OUTPATIENT
Start: 2018-08-30 | End: 2018-09-06

## 2018-08-30 RX ORDER — SODIUM CHLORIDE 9 MG/ML
250 INJECTION, SOLUTION INTRAVENOUS
Qty: 0 | Refills: 0 | Status: COMPLETED | OUTPATIENT
Start: 2018-08-30 | End: 2018-08-30

## 2018-08-30 RX ORDER — SODIUM CHLORIDE 9 MG/ML
1000 INJECTION, SOLUTION INTRAVENOUS
Qty: 0 | Refills: 0 | Status: DISCONTINUED | OUTPATIENT
Start: 2018-08-30 | End: 2018-08-30

## 2018-08-30 RX ORDER — INSULIN LISPRO 100/ML
32 VIAL (ML) SUBCUTANEOUS
Qty: 0 | Refills: 0 | Status: DISCONTINUED | OUTPATIENT
Start: 2018-08-30 | End: 2018-08-30

## 2018-08-30 RX ORDER — INSULIN LISPRO 100/ML
VIAL (ML) SUBCUTANEOUS
Qty: 0 | Refills: 0 | Status: DISCONTINUED | OUTPATIENT
Start: 2018-08-30 | End: 2018-08-30

## 2018-08-30 RX ORDER — SODIUM CHLORIDE 9 MG/ML
1000 INJECTION, SOLUTION INTRAVENOUS
Qty: 0 | Refills: 0 | Status: DISCONTINUED | OUTPATIENT
Start: 2018-08-30 | End: 2018-09-06

## 2018-08-30 RX ORDER — DEXTROSE 50 % IN WATER 50 %
12.5 SYRINGE (ML) INTRAVENOUS ONCE
Qty: 0 | Refills: 0 | Status: DISCONTINUED | OUTPATIENT
Start: 2018-08-30 | End: 2018-09-06

## 2018-08-30 RX ORDER — INSULIN DETEMIR 100/ML (3)
40 INSULIN PEN (ML) SUBCUTANEOUS AT BEDTIME
Qty: 0 | Refills: 0 | Status: DISCONTINUED | OUTPATIENT
Start: 2018-08-30 | End: 2018-09-02

## 2018-08-30 RX ORDER — DEXTROSE 50 % IN WATER 50 %
25 SYRINGE (ML) INTRAVENOUS ONCE
Qty: 0 | Refills: 0 | Status: DISCONTINUED | OUTPATIENT
Start: 2018-08-30 | End: 2018-09-06

## 2018-08-30 RX ORDER — SODIUM CHLORIDE 9 MG/ML
500 INJECTION, SOLUTION INTRAVENOUS ONCE
Qty: 0 | Refills: 0 | Status: COMPLETED | OUTPATIENT
Start: 2018-08-30 | End: 2018-08-30

## 2018-08-30 RX ORDER — SODIUM CHLORIDE 9 MG/ML
1000 INJECTION, SOLUTION INTRAVENOUS
Qty: 0 | Refills: 0 | Status: DISCONTINUED | OUTPATIENT
Start: 2018-08-30 | End: 2018-08-31

## 2018-08-30 RX ORDER — INSULIN LISPRO 100/ML
VIAL (ML) SUBCUTANEOUS
Qty: 0 | Refills: 0 | Status: DISCONTINUED | OUTPATIENT
Start: 2018-08-30 | End: 2018-08-31

## 2018-08-30 RX ORDER — FOLIC ACID 0.8 MG
1 TABLET ORAL DAILY
Qty: 0 | Refills: 0 | Status: DISCONTINUED | OUTPATIENT
Start: 2018-08-30 | End: 2018-09-06

## 2018-08-30 RX ORDER — INSULIN LISPRO 100/ML
30 VIAL (ML) SUBCUTANEOUS
Qty: 0 | Refills: 0 | Status: DISCONTINUED | OUTPATIENT
Start: 2018-08-30 | End: 2018-09-03

## 2018-08-30 RX ADMIN — Medication 12 MILLIGRAM(S): at 12:08

## 2018-08-30 RX ADMIN — Medication 32 UNIT(S): at 13:08

## 2018-08-30 RX ADMIN — SODIUM CHLORIDE 1000 MILLILITER(S): 9 INJECTION, SOLUTION INTRAVENOUS at 11:30

## 2018-08-30 RX ADMIN — Medication 0.25 MILLIGRAM(S): at 12:08

## 2018-08-30 RX ADMIN — SODIUM CHLORIDE 500 MILLILITER(S): 9 INJECTION, SOLUTION INTRAVENOUS at 11:04

## 2018-08-30 RX ADMIN — Medication 2: at 18:43

## 2018-08-30 RX ADMIN — Medication 30 UNIT(S): at 17:25

## 2018-08-30 RX ADMIN — Medication 40 UNIT(S): at 22:50

## 2018-08-30 NOTE — ADVANCED PRACTICE NURSE CONSULT - ASSESSMENT
was to see pt by dr luther. pt is 36 weeks gestation type 1 diabetes. she was dx w type 1 3 years ago and was started on insulin. she was lorin w dr blackman. she was taking levemir 52 units qhs via insulin pen and  humalog 32 units +ss. she states she was having many low specialy in am. she did not remeber her last hemoglobin a1c. pt was educated about the effect of steroids on bg. pt and family verbalized understanding. also discussed healthy eating habits and portions and sugar free vs sugar. pt made adjustments for her meal accordingly.

## 2018-08-30 NOTE — DISCHARGE NOTE ANTEPARTUM - PATIENT PORTAL LINK FT
You can access the [facility] Patient Portal, offered by [facility], by registering with the following website: [email or web address]

## 2018-08-30 NOTE — ADVANCED PRACTICE NURSE CONSULT - RECOMMEDATIONS
continue diabetes self mnaagement education w pt and family  pls consider change nph to levemir 40 units qhs ( due to frequent severe hypos)  humalog 30 units before meals + ss 0-130 0 units                                                       131-150 4units                                                       151-170 2 units                                                       call md for bg >170  start hypoglycemia protocol @bg <60  bgm before meals and 1 hour after the begining of the meal

## 2018-08-31 DIAGNOSIS — Z3A.36 36 WEEKS GESTATION OF PREGNANCY: ICD-10-CM

## 2018-08-31 DIAGNOSIS — Z29.9 ENCOUNTER FOR PROPHYLACTIC MEASURES, UNSPECIFIED: ICD-10-CM

## 2018-08-31 DIAGNOSIS — O24.013 PRE-EXISTING TYPE 1 DIABETES MELLITUS, IN PREGNANCY, THIRD TRIMESTER: ICD-10-CM

## 2018-08-31 DIAGNOSIS — O35.8XX0 MATERNAL CARE FOR OTHER (SUSPECTED) FETAL ABNORMALITY AND DAMAGE, NOT APPLICABLE OR UNSPECIFIED: ICD-10-CM

## 2018-08-31 LAB
C TRACH RRNA SPEC QL NAA+PROBE: SIGNIFICANT CHANGE UP
GLUCOSE BLDC GLUCOMTR-MCNC: 101 MG/DL — HIGH (ref 70–99)
GLUCOSE BLDC GLUCOMTR-MCNC: 106 MG/DL — HIGH (ref 70–99)
GLUCOSE BLDC GLUCOMTR-MCNC: 107 MG/DL — HIGH (ref 70–99)
GLUCOSE BLDC GLUCOMTR-MCNC: 125 MG/DL — HIGH (ref 70–99)
GLUCOSE BLDC GLUCOMTR-MCNC: 139 MG/DL — HIGH (ref 70–99)
GLUCOSE BLDC GLUCOMTR-MCNC: 146 MG/DL — HIGH (ref 70–99)
GLUCOSE BLDC GLUCOMTR-MCNC: 170 MG/DL — HIGH (ref 70–99)
HBA1C BLD-MCNC: 7.7 % — HIGH (ref 4–5.6)
N GONORRHOEA RRNA SPEC QL NAA+PROBE: SIGNIFICANT CHANGE UP
SPECIMEN SOURCE: SIGNIFICANT CHANGE UP
T PALLIDUM AB TITR SER: NEGATIVE — SIGNIFICANT CHANGE UP

## 2018-08-31 RX ORDER — DEXTROSE 50 % IN WATER 50 %
25 SYRINGE (ML) INTRAVENOUS ONCE
Qty: 0 | Refills: 0 | Status: DISCONTINUED | OUTPATIENT
Start: 2018-08-31 | End: 2018-09-06

## 2018-08-31 RX ORDER — GLUCAGON INJECTION, SOLUTION 0.5 MG/.1ML
1 INJECTION, SOLUTION SUBCUTANEOUS ONCE
Qty: 0 | Refills: 0 | Status: DISCONTINUED | OUTPATIENT
Start: 2018-08-31 | End: 2018-09-06

## 2018-08-31 RX ORDER — SODIUM CHLORIDE 9 MG/ML
1000 INJECTION, SOLUTION INTRAVENOUS
Qty: 0 | Refills: 0 | Status: DISCONTINUED | OUTPATIENT
Start: 2018-08-31 | End: 2018-09-06

## 2018-08-31 RX ORDER — INSULIN LISPRO 100/ML
VIAL (ML) SUBCUTANEOUS
Qty: 0 | Refills: 0 | Status: DISCONTINUED | OUTPATIENT
Start: 2018-08-31 | End: 2018-08-31

## 2018-08-31 RX ORDER — INSULIN LISPRO 100/ML
VIAL (ML) SUBCUTANEOUS
Qty: 0 | Refills: 0 | Status: DISCONTINUED | OUTPATIENT
Start: 2018-08-31 | End: 2018-09-03

## 2018-08-31 RX ORDER — DEXTROSE 50 % IN WATER 50 %
15 SYRINGE (ML) INTRAVENOUS ONCE
Qty: 0 | Refills: 0 | Status: DISCONTINUED | OUTPATIENT
Start: 2018-08-31 | End: 2018-09-06

## 2018-08-31 RX ORDER — SODIUM CHLORIDE 9 MG/ML
3 INJECTION INTRAMUSCULAR; INTRAVENOUS; SUBCUTANEOUS EVERY 8 HOURS
Qty: 0 | Refills: 0 | Status: DISCONTINUED | OUTPATIENT
Start: 2018-08-31 | End: 2018-09-02

## 2018-08-31 RX ORDER — DEXTROSE 50 % IN WATER 50 %
12.5 SYRINGE (ML) INTRAVENOUS ONCE
Qty: 0 | Refills: 0 | Status: DISCONTINUED | OUTPATIENT
Start: 2018-08-31 | End: 2018-09-06

## 2018-08-31 RX ADMIN — Medication 30 UNIT(S): at 12:50

## 2018-08-31 RX ADMIN — Medication 1 MILLIGRAM(S): at 18:15

## 2018-08-31 RX ADMIN — Medication 12 MILLIGRAM(S): at 12:17

## 2018-08-31 RX ADMIN — Medication 30 UNIT(S): at 17:15

## 2018-08-31 RX ADMIN — Medication 1 TABLET(S): at 18:38

## 2018-08-31 RX ADMIN — Medication 30 UNIT(S): at 09:02

## 2018-08-31 RX ADMIN — Medication 40 UNIT(S): at 22:21

## 2018-08-31 NOTE — CONSULT NOTE ADULT - PROBLEM SELECTOR RECOMMENDATION 9
- continue NST  2 times per day  - pt given steroids to promote fetal lung maturity. Give second dose today. Agree with steroids to promote fetal lung maturity. Give second dose today. Continue close maternal and fetal surveillance. Neonatologist to speak with patient regarding  outcomes of early term deliveries

## 2018-08-31 NOTE — CONSULT NOTE ADULT - PROBLEM SELECTOR RECOMMENDATION 4
- SCD while in bed, encourage ambulation and oral hydration. - SCD while in bed, encourage ambulation and oral hydration

## 2018-08-31 NOTE — ADVANCED PRACTICE NURSE CONSULT - ASSESSMENT
return to see pt in am. pt is a+ox3bg is improved but not @ target. pt was encouraged to ambulate after meals to improve glycemic control. emotional support provided

## 2018-08-31 NOTE — CHART NOTE - NSCHARTNOTEFT_GEN_A_CORE
NST tracings reviewed with Dr. Conner. Tracing is reactive, patient not feeling contractions but toco reports contractions every 6-7 minutes. Patient is stable and can return to floor. NST tracings reviewed with Dr. Conner. Tracings reactive, patient not feeling contractions but toco reports contractions every 6-7 minutes. Patient is stable and can return to floor.

## 2018-08-31 NOTE — CONSULT NOTE ADULT - SUBJECTIVE AND OBJECTIVE BOX
IVY DE SANTIAGO 23y   CHRISSY 20 by 1st trimester US    Patient with Chief complaint of Patient is a 23y old  Female at 36 6/7 wks GA who presents with a chief complaint of poorly controlled glucose secondary to type 1 diabetes in pregnancy.  She was diagnosed by polyhydramnios earlier this pregnancy. Latest sono showed MADHAVI =20 cm yesterday. She was given betamethasone yesterday around noon for fetal lung maturation. She has no current complaints this morning. + FM, no LOF or VB.       REVIEW OF SYSTEMS:    CONSTITUTIONAL: No weakness, fevers or chills  EYES/ENT: No visual changes;  No vertigo or throat pain   NECK: No pain or stiffness  RESPIRATORY: No cough, wheezing, hemoptysis; No shortness of breath  CARDIOVASCULAR: No chest pain or palpitations  GASTROINTESTINAL: No abdominal or epigastric pain. No nausea, vomiting, or hematemesis; No diarrhea or constipation. No melena or hematochezia.  GENITOURINARY: No dysuria, frequency or hematuria  NEUROLOGICAL: No numbness or weakness  SKIN: No itching, burning, rashes, or lesions   All other review of systems is negative unless indicated above.  PAST MEDICAL & SURGICAL HISTORY:    betamethasone Injectable 12 milliGRAM(s) IntraMuscular every 24 hours  dextrose 40% Gel 15 Gram(s) Oral once PRN  dextrose 40% Gel 15 Gram(s) Oral once PRN  dextrose 5%. 1000 milliLiter(s) IV Continuous <Continuous>  dextrose 5%. 1000 milliLiter(s) IV Continuous <Continuous>  dextrose 50% Injectable 12.5 Gram(s) IV Push once  dextrose 50% Injectable 25 Gram(s) IV Push once  dextrose 50% Injectable 25 Gram(s) IV Push once  dextrose 50% Injectable 12.5 Gram(s) IV Push once  dextrose 50% Injectable 25 Gram(s) IV Push once  dextrose 50% Injectable 25 Gram(s) IV Push once  folic acid 1 milliGRAM(s) Oral daily  glucagon  Injectable 1 milliGRAM(s) IntraMuscular once PRN  glucagon  Injectable 1 milliGRAM(s) IntraMuscular once PRN  insulin detemir injectable (LEVEMIR) 40 Unit(s) SubCutaneous at bedtime  insulin lispro (HumaLOG) corrective regimen sliding scale   SubCutaneous three times a day with meals  insulin lispro (HumaLOG) corrective regimen sliding scale   SubCutaneous three times a day before meals  insulin lispro Injectable (HumaLOG) 30 Unit(s) SubCutaneous three times a day before meals  lactated ringers. 1000 milliLiter(s) IV Continuous <Continuous>  prenatal multivitamin 1 Tablet(s) Oral daily    Allergies: No Known Allergies      Social History: Denies ETOH, smoking and drugs.   POB/GYN Hx: 1 TOP, 1 SAB    Vital Signs:  Vital Signs Last 24 Hrs  T(C): 36.8 (31 Aug 2018 04:52), Max: 36.9 (30 Aug 2018 15:00)  T(F): 98.2 (31 Aug 2018 04:52), Max: 98.4 (30 Aug 2018 15:00)  HR: 87 (31 Aug 2018 04:52) (87 - 97)  BP: 137/87 (31 Aug 2018 04:52) (134/76 - 137/87)  RR: 18 (31 Aug 2018 04:52) (16 - 18)  SpO2: 98% (31 Aug 2018 04:52) (98% - 98%)    Physical Exam:  General: Adult female in NAD  Head/Neck: No neck masses, no lymphadenopathy  CVS: RRR, +S1/S2  Lungs: CTAB, no wheeze, ronchi or rales.   Breast: No tenderness or abnormal discharge.  Abdomen: +BS, soft, NT, gravid abdomen.   Pelvic: Deferred  Ext: No cyanosis, edema or calf tenderness.   Skin: No rashes, or lesions  Neuro: Normal DTRs, grossly intact    Labs:                          12.5   11.1  )-----------( 162      ( 30 Aug 2018 11:53 )             38.1         139  |  105  |  8.0  ----------------------------<  48<LL>  4.2   |  21.0<L>  |  0.72    Ca    9.3      30 Aug 2018 11:53    TPro  6.8  /  Alb  3.2<L>  /  TBili  <0.2<L>  /  DBili  x   /  AST  27  /  ALT  7   /  AlkPhos  179<H>      CAPILLARY BLOOD GLUCOSE      POCT Blood Glucose.: 117 mg/dL (30 Aug 2018 22:48)  POCT Blood Glucose.: 200 mg/dL (30 Aug 2018 18:39)  POCT Blood Glucose.: 159 mg/dL (30 Aug 2018 17:14)  POCT Blood Glucose.: 101 mg/dL (30 Aug 2018 13:03)  POCT Blood Glucose.: 57 mg/dL (30 Aug 2018 10:40)          MEDICATIONS  (STANDING):  betamethasone Injectable 12 milliGRAM(s) IntraMuscular every 24 hours  dextrose 5%. 1000 milliLiter(s) (50 mL/Hr) IV Continuous <Continuous>  dextrose 5%. 1000 milliLiter(s) (50 mL/Hr) IV Continuous <Continuous>  dextrose 50% Injectable 12.5 Gram(s) IV Push once  dextrose 50% Injectable 25 Gram(s) IV Push once  dextrose 50% Injectable 25 Gram(s) IV Push once  dextrose 50% Injectable 12.5 Gram(s) IV Push once  dextrose 50% Injectable 25 Gram(s) IV Push once  dextrose 50% Injectable 25 Gram(s) IV Push once  folic acid 1 milliGRAM(s) Oral daily  insulin detemir injectable (LEVEMIR) 40 Unit(s) SubCutaneous at bedtime  insulin lispro (HumaLOG) corrective regimen sliding scale   SubCutaneous three times a day with meals  insulin lispro (HumaLOG) corrective regimen sliding scale   SubCutaneous three times a day before meals  insulin lispro Injectable (HumaLOG) 30 Unit(s) SubCutaneous three times a day before meals  lactated ringers. 1000 milliLiter(s) (125 mL/Hr) IV Continuous <Continuous>  prenatal multivitamin 1 Tablet(s) Oral daily    MEDICATIONS  (PRN):  dextrose 40% Gel 15 Gram(s) Oral once PRN Blood Glucose LESS THAN 70 milliGRAM(s)/deciliter  dextrose 40% Gel 15 Gram(s) Oral once PRN Blood Glucose LESS THAN 70 milliGRAM(s)/deciliter  glucagon  Injectable 1 milliGRAM(s) IntraMuscular once PRN Glucose LESS THAN 70 milligrams/deciliter  glucagon  Injectable 1 milliGRAM(s) IntraMuscular once PRN Glucose LESS THAN 70 milligrams/deciliter IVY DE SANTIAGO 22yo   CHRISSY 20 by 1st trimester US    Patient is a 23 year old Female at 36 6/7 wks GA who presents with a chief complaint of poorly controlled glucose. She has type 1 diabetes during pregnancy that is being treated with insulin.  She was diagnosed with polyhydramnios earlier during this pregnancy. The latest sonogram done yesterday reported the MADHAVI = 20 cm. She was given steroids (betamethasone) yesterday around noon to promote fetal lung maturation. She has no current complaints. + FM, no LOF or vaginal bleeding. Fetus has been diagnosed with a ventricular septal defect.      REVIEW OF SYSTEMS:  CONSTITUTIONAL: No weakness, fevers or chills  EYES/ENT: No visual changes;  No vertigo or throat pain   NECK: No pain or stiffness  RESPIRATORY: No cough, wheezing, hemoptysis; No shortness of breath  CARDIOVASCULAR: No chest pain or palpitations  GASTROINTESTINAL: No abdominal or epigastric pain. No nausea, vomiting, or hematemesis; No diarrhea or constipation. No melena or hematochezia.  GENITOURINARY: No dysuria, frequency or hematuria  NEUROLOGICAL: No numbness or weakness  SKIN: No itching, burning, rashes, or lesions   All other review of systems is negative unless indicated above.  PAST MEDICAL & SURGICAL HISTORY:    Allergies: No Known Allergies    Social History: Denies ETOH, smoking and drugs.     Past OB/GYN Hx: 1 TOP, 1 SAB    Family History: Mother with hypertension, Father with CAD    Vital Signs:  Vital Signs Last 24 Hrs  T(C): 36.8 (31 Aug 2018 04:52), Max: 36.9 (30 Aug 2018 15:00)  T(F): 98.2 (31 Aug 2018 04:52), Max: 98.4 (30 Aug 2018 15:00)  HR: 87 (31 Aug 2018 04:52) (87 - 97)  BP: 137/87 (31 Aug 2018 04:52) (134/76 - 137/87)  RR: 18 (31 Aug 2018 04:52) (16 - 18)  SpO2: 98% (31 Aug 2018 04:52) (98% - 98%)    Physical Exam:  General: Adult female in NAD  Head/Neck: No neck masses, no lymphadenopathy  CVS: RRR, +S1/S2  Lungs: CTAB, no wheeze, ronchi or rales.   Breast: No tenderness or abnormal discharge.  Abdomen: +BS, soft, NT, gravid abdomen.   Pelvic: Deferred  Ext: No cyanosis, edema or calf tenderness.   Skin: No rashes, or lesions  Neuro: Normal DTRs, grossly intact    Labs:                        12.5   11.1  )-----------( 162      ( 30 Aug 2018 11:53 )             38.1         139  |  105  |  8.0  ----------------------------<  48<LL>  4.2   |  21.0<L>  |  0.72    Ca    9.3      30 Aug 2018 11:53    TPro  6.8  /  Alb  3.2<L>  /  TBili  <0.2<L>  /  DBili  x   /  AST  27  /  ALT  7   /  AlkPhos  179<H>      CAPILLARY BLOOD GLUCOSE  POCT Blood Glucose.: 117 mg/dL (30 Aug 2018 22:48)  POCT Blood Glucose.: 200 mg/dL (30 Aug 2018 18:39)  POCT Blood Glucose.: 159 mg/dL (30 Aug 2018 17:14)  POCT Blood Glucose.: 101 mg/dL (30 Aug 2018 13:03)  POCT Blood Glucose.: 57 mg/dL (30 Aug 2018 10:40)    MEDICATIONS  (STANDING):  betamethasone Injectable 12 milliGRAM(s) IntraMuscular every 24 hours  dextrose 5%. 1000 milliLiter(s) (50 mL/Hr) IV Continuous <Continuous>  dextrose 5%. 1000 milliLiter(s) (50 mL/Hr) IV Continuous <Continuous>  dextrose 50% Injectable 12.5 Gram(s) IV Push once  dextrose 50% Injectable 25 Gram(s) IV Push once  dextrose 50% Injectable 25 Gram(s) IV Push once  dextrose 50% Injectable 12.5 Gram(s) IV Push once  dextrose 50% Injectable 25 Gram(s) IV Push once  dextrose 50% Injectable 25 Gram(s) IV Push once  folic acid 1 milliGRAM(s) Oral daily  insulin detemir injectable (LEVEMIR) 40 Unit(s) SubCutaneous at bedtime  insulin lispro (HumaLOG) corrective regimen sliding scale   SubCutaneous three times a day with meals  insulin lispro (HumaLOG) corrective regimen sliding scale   SubCutaneous three times a day before meals  insulin lispro Injectable (HumaLOG) 30 Unit(s) SubCutaneous three times a day before meals  lactated ringers. 1000 milliLiter(s) (125 mL/Hr) IV Continuous <Continuous>  prenatal multivitamin 1 Tablet(s) Oral daily    MEDICATIONS  (PRN):  dextrose 40% Gel 15 Gram(s) Oral once PRN Blood Glucose LESS THAN 70 milliGRAM(s)/deciliter  dextrose 40% Gel 15 Gram(s) Oral once PRN Blood Glucose LESS THAN 70 milliGRAM(s)/deciliter  glucagon  Injectable 1 milliGRAM(s) IntraMuscular once PRN Glucose LESS THAN 70 milligrams/deciliter  glucagon  Injectable 1 milliGRAM(s) IntraMuscular once PRN Glucose LESS THAN 70 milligrams/deciliter

## 2018-08-31 NOTE — ADVANCED PRACTICE NURSE CONSULT - RECOMMEDATIONS
continue diabetes self management education  encourage pt to ambulate after meals to promote euglycemic levels

## 2018-08-31 NOTE — CONSULT NOTE ADULT - PROBLEM SELECTOR RECOMMENDATION 2
- poor glycemic control due to non-adherence to diet and insulin regimen as outpatient. Hospitalized currently to optimize glycemic control prior to induction of labor at 37 weeks due to increased risk of still birth.   - Continue current insuline regimen and add sliding scale to pre-meal humalog The poor glycemic control can be attributed to non-adherence to diet and insulin regimen as outpatient. Hospitalized currently to optimize glycemic control prior to induction of labor at 37 weeks due to increased risk of stillbirth. Diabetes educator to see patient during hospitalization. Neonatologist to speak with patient regarding  outcomes of pregnancies complicated by diabetes.  Continue adjusting current insulin regimen and add sliding scale before and after meals

## 2018-08-31 NOTE — CONSULT NOTE ADULT - ASSESSMENT
24 yo  here at 36.6 wks GA with Type 1 diabetes with poor glycemic control. She has continued to have sub optimal glycemic control over the last 12 hours during the hospital. Fetus is known to have congenital heart disease (VSD). 22 yo  at 36 weeks and 6 days GA with Type 1 diabetes with poor glycemic control. She has continued to have suboptimal glycemic control over the last 12 hours. Fetus is known to have congenital heart disease (VSD).

## 2018-09-01 DIAGNOSIS — Z3A.37 37 WEEKS GESTATION OF PREGNANCY: ICD-10-CM

## 2018-09-01 LAB
CULTURE RESULTS: SIGNIFICANT CHANGE UP
CULTURE RESULTS: SIGNIFICANT CHANGE UP
GLUCOSE BLDC GLUCOMTR-MCNC: 138 MG/DL — HIGH (ref 70–99)
GLUCOSE BLDC GLUCOMTR-MCNC: 171 MG/DL — HIGH (ref 70–99)
GLUCOSE BLDC GLUCOMTR-MCNC: 179 MG/DL — HIGH (ref 70–99)
GLUCOSE BLDC GLUCOMTR-MCNC: 193 MG/DL — HIGH (ref 70–99)
GLUCOSE BLDC GLUCOMTR-MCNC: 244 MG/DL — HIGH (ref 70–99)
GLUCOSE BLDC GLUCOMTR-MCNC: 245 MG/DL — HIGH (ref 70–99)
GLUCOSE BLDC GLUCOMTR-MCNC: 51 MG/DL — LOW (ref 70–99)
GLUCOSE BLDC GLUCOMTR-MCNC: 96 MG/DL — SIGNIFICANT CHANGE UP (ref 70–99)
SPECIMEN SOURCE: SIGNIFICANT CHANGE UP
SPECIMEN SOURCE: SIGNIFICANT CHANGE UP

## 2018-09-01 PROCEDURE — 59025 FETAL NON-STRESS TEST: CPT | Mod: 26

## 2018-09-01 RX ORDER — DEXTROSE 50 % IN WATER 50 %
15 SYRINGE (ML) INTRAVENOUS ONCE
Qty: 0 | Refills: 0 | Status: COMPLETED | OUTPATIENT
Start: 2018-09-01 | End: 2018-09-01

## 2018-09-01 RX ADMIN — Medication 15 GRAM(S): at 22:37

## 2018-09-01 RX ADMIN — Medication 30 UNIT(S): at 12:55

## 2018-09-01 RX ADMIN — Medication 40 UNIT(S): at 23:15

## 2018-09-01 RX ADMIN — Medication 30 UNIT(S): at 17:15

## 2018-09-01 RX ADMIN — SODIUM CHLORIDE 3 MILLILITER(S): 9 INJECTION INTRAMUSCULAR; INTRAVENOUS; SUBCUTANEOUS at 23:05

## 2018-09-01 RX ADMIN — Medication: at 08:46

## 2018-09-01 RX ADMIN — Medication 1 TABLET(S): at 12:55

## 2018-09-01 RX ADMIN — Medication 8: at 12:54

## 2018-09-01 RX ADMIN — SODIUM CHLORIDE 3 MILLILITER(S): 9 INJECTION INTRAMUSCULAR; INTRAVENOUS; SUBCUTANEOUS at 14:15

## 2018-09-01 RX ADMIN — Medication 30 UNIT(S): at 08:46

## 2018-09-01 RX ADMIN — Medication 1 MILLIGRAM(S): at 12:51

## 2018-09-01 RX ADMIN — Medication 8: at 09:00

## 2018-09-01 NOTE — PROGRESS NOTE ADULT - ASSESSMENT
23y   CHRISSY 18 by 1st trimester US at 37 0/7 wks GA with type 1 diabetes here for glycemic optimization prior to IOL. Glycemic control was better yesterday. No episodes of hypoglycemia recorded or experienced. 1 hr post dinner was high, and her pre-meals are slightly higher than ideal. Discuss changing pre-meal insulin.

## 2018-09-01 NOTE — PROGRESS NOTE ADULT - SUBJECTIVE AND OBJECTIVE BOX
A 23y   CHRISSY 18 by 1st trimester US at 37 0/7 wks GA with type 1 diabetes, poorly controlled, here for glucose control and induction of labor. She is s/p betamethasone course.     No issues overnight. + FM, no LOF/VB. No calf pain or SOB. Pt reports feeling more pressure in her pelvis when walking than prior. Denies feeling like she was hypoglycemic yesterday at all.     T(F): 98.4 (18 @ 20:05), Max: 98.5 (18 @ 08:24)  HR: 100 (18 @ 20:05) (88 - 100)  BP: 133/74 (18 @ 20:05) (115/74 - 133/74)  RR: 20 (18 @ 20:05) (18 - 20)  SpO2: --    18 @ 07:01  -  18 @ 07:00  --------------------------------------------------------  IN: 250 mL / OUT: 0 mL / NET: 250 mL          PE: Well nourished and sleeping, rousable   Pulm: CTABL, speaking in full sentences   Abd: soft non-tender, gravid, no contractions palpated   Ext: trace edema bilaterally, no pitting, neg homans        Lab:   CAPILLARY BLOOD GLUCOSE      POCT Blood Glucose.: 107 mg/dL (31 Aug 2018 21:54)  POCT Blood Glucose.: 146 mg/dL (31 Aug 2018 18:11)  POCT Blood Glucose.: 125 mg/dL (31 Aug 2018 17:13)  POCT Blood Glucose.: 106 mg/dL (31 Aug 2018 14:09)  POCT Blood Glucose.: 101 mg/dL (31 Aug 2018 12:37)  POCT Blood Glucose.: 170 mg/dL (31 Aug 2018 10:12)  POCT Blood Glucose.: 139 mg/dL (31 Aug 2018 08:34)

## 2018-09-01 NOTE — CHART NOTE - NSCHARTNOTEFT_GEN_A_CORE
Nurse called OBGYN resident regarding postpradial FS of 244 after lunch. ISS was unclear regarding how much insulin to give at this point. History and FS values were reviewed with MFM on call. Dr. Chan recommends 10 units of Humulog and close monitoring. Patient is currently on 40units Levemir qhs and 30 units of Humulog before every meal with ISS. Patient is asymptomatic.

## 2018-09-02 ENCOUNTER — TRANSCRIPTION ENCOUNTER (OUTPATIENT)
Age: 23
End: 2018-09-02

## 2018-09-02 LAB
ALBUMIN SERPL ELPH-MCNC: 3.3 G/DL — SIGNIFICANT CHANGE UP (ref 3.3–5.2)
ALBUMIN SERPL ELPH-MCNC: 3.4 G/DL — SIGNIFICANT CHANGE UP (ref 3.3–5.2)
ALP SERPL-CCNC: 185 U/L — HIGH (ref 40–120)
ALP SERPL-CCNC: 188 U/L — HIGH (ref 40–120)
ALT FLD-CCNC: 51 U/L — HIGH
ALT FLD-CCNC: 60 U/L — HIGH
ANION GAP SERPL CALC-SCNC: 14 MMOL/L — SIGNIFICANT CHANGE UP (ref 5–17)
ANION GAP SERPL CALC-SCNC: 15 MMOL/L — SIGNIFICANT CHANGE UP (ref 5–17)
APTT BLD: 22.1 SEC — LOW (ref 27.5–37.4)
APTT BLD: 23.8 SEC — LOW (ref 27.5–37.4)
AST SERPL-CCNC: 129 U/L — HIGH
AST SERPL-CCNC: 142 U/L — HIGH
BASOPHILS # BLD AUTO: 0 K/UL — SIGNIFICANT CHANGE UP (ref 0–0.2)
BASOPHILS # BLD AUTO: 0 K/UL — SIGNIFICANT CHANGE UP (ref 0–0.2)
BASOPHILS NFR BLD AUTO: 0.2 % — SIGNIFICANT CHANGE UP (ref 0–2)
BASOPHILS NFR BLD AUTO: 0.2 % — SIGNIFICANT CHANGE UP (ref 0–2)
BILIRUB SERPL-MCNC: <0.2 MG/DL — LOW (ref 0.4–2)
BILIRUB SERPL-MCNC: <0.2 MG/DL — LOW (ref 0.4–2)
BUN SERPL-MCNC: 10 MG/DL — SIGNIFICANT CHANGE UP (ref 8–20)
BUN SERPL-MCNC: 13 MG/DL — SIGNIFICANT CHANGE UP (ref 8–20)
CALCIUM SERPL-MCNC: 8.6 MG/DL — SIGNIFICANT CHANGE UP (ref 8.6–10.2)
CALCIUM SERPL-MCNC: 9.1 MG/DL — SIGNIFICANT CHANGE UP (ref 8.6–10.2)
CHLORIDE SERPL-SCNC: 103 MMOL/L — SIGNIFICANT CHANGE UP (ref 98–107)
CHLORIDE SERPL-SCNC: 97 MMOL/L — LOW (ref 98–107)
CO2 SERPL-SCNC: 20 MMOL/L — LOW (ref 22–29)
CO2 SERPL-SCNC: 22 MMOL/L — SIGNIFICANT CHANGE UP (ref 22–29)
CREAT ?TM UR-MCNC: 28 MG/DL — SIGNIFICANT CHANGE UP
CREAT ?TM UR-MCNC: 47 MG/DL — SIGNIFICANT CHANGE UP
CREAT SERPL-MCNC: 0.66 MG/DL — SIGNIFICANT CHANGE UP (ref 0.5–1.3)
CREAT SERPL-MCNC: 0.8 MG/DL — SIGNIFICANT CHANGE UP (ref 0.5–1.3)
EOSINOPHIL # BLD AUTO: 0 K/UL — SIGNIFICANT CHANGE UP (ref 0–0.5)
EOSINOPHIL # BLD AUTO: 0 K/UL — SIGNIFICANT CHANGE UP (ref 0–0.5)
EOSINOPHIL NFR BLD AUTO: 0.2 % — SIGNIFICANT CHANGE UP (ref 0–6)
EOSINOPHIL NFR BLD AUTO: 0.3 % — SIGNIFICANT CHANGE UP (ref 0–6)
FIBRINOGEN PPP-MCNC: 592 MG/DL — HIGH (ref 310–510)
GLUCOSE BLDC GLUCOMTR-MCNC: 112 MG/DL — HIGH (ref 70–99)
GLUCOSE BLDC GLUCOMTR-MCNC: 117 MG/DL — HIGH (ref 70–99)
GLUCOSE BLDC GLUCOMTR-MCNC: 160 MG/DL — HIGH (ref 70–99)
GLUCOSE BLDC GLUCOMTR-MCNC: 234 MG/DL — HIGH (ref 70–99)
GLUCOSE BLDC GLUCOMTR-MCNC: 62 MG/DL — LOW (ref 70–99)
GLUCOSE BLDC GLUCOMTR-MCNC: 86 MG/DL — SIGNIFICANT CHANGE UP (ref 70–99)
GLUCOSE BLDC GLUCOMTR-MCNC: 91 MG/DL — SIGNIFICANT CHANGE UP (ref 70–99)
GLUCOSE SERPL-MCNC: 165 MG/DL — HIGH (ref 70–115)
GLUCOSE SERPL-MCNC: 93 MG/DL — SIGNIFICANT CHANGE UP (ref 70–115)
HCT VFR BLD CALC: 37.7 % — SIGNIFICANT CHANGE UP (ref 37–47)
HCT VFR BLD CALC: 37.8 % — SIGNIFICANT CHANGE UP (ref 37–47)
HGB BLD-MCNC: 12.3 G/DL — SIGNIFICANT CHANGE UP (ref 12–16)
HGB BLD-MCNC: 12.3 G/DL — SIGNIFICANT CHANGE UP (ref 12–16)
INR BLD: 0.95 RATIO — SIGNIFICANT CHANGE UP (ref 0.88–1.16)
INR BLD: 0.98 RATIO — SIGNIFICANT CHANGE UP (ref 0.88–1.16)
LDH SERPL L TO P-CCNC: 327 U/L — HIGH (ref 98–192)
LYMPHOCYTES # BLD AUTO: 1.9 K/UL — SIGNIFICANT CHANGE UP (ref 1–4.8)
LYMPHOCYTES # BLD AUTO: 1.9 K/UL — SIGNIFICANT CHANGE UP (ref 1–4.8)
LYMPHOCYTES # BLD AUTO: 14.8 % — LOW (ref 20–55)
LYMPHOCYTES # BLD AUTO: 16.1 % — LOW (ref 20–55)
MAGNESIUM SERPL-MCNC: 5.6 MG/DL — HIGH (ref 1.6–2.6)
MCHC RBC-ENTMCNC: 26.6 PG — LOW (ref 27–31)
MCHC RBC-ENTMCNC: 27 PG — SIGNIFICANT CHANGE UP (ref 27–31)
MCHC RBC-ENTMCNC: 32.5 G/DL — SIGNIFICANT CHANGE UP (ref 32–36)
MCHC RBC-ENTMCNC: 32.6 G/DL — SIGNIFICANT CHANGE UP (ref 32–36)
MCV RBC AUTO: 81.6 FL — SIGNIFICANT CHANGE UP (ref 81–99)
MCV RBC AUTO: 83.1 FL — SIGNIFICANT CHANGE UP (ref 81–99)
MONOCYTES # BLD AUTO: 1.1 K/UL — HIGH (ref 0–0.8)
MONOCYTES # BLD AUTO: 1.2 K/UL — HIGH (ref 0–0.8)
MONOCYTES NFR BLD AUTO: 9.6 % — SIGNIFICANT CHANGE UP (ref 3–10)
MONOCYTES NFR BLD AUTO: 9.7 % — SIGNIFICANT CHANGE UP (ref 3–10)
NEUTROPHILS # BLD AUTO: 8.1 K/UL — HIGH (ref 1.8–8)
NEUTROPHILS # BLD AUTO: 9.3 K/UL — HIGH (ref 1.8–8)
NEUTROPHILS NFR BLD AUTO: 70.2 % — SIGNIFICANT CHANGE UP (ref 37–73)
NEUTROPHILS NFR BLD AUTO: 72.8 % — SIGNIFICANT CHANGE UP (ref 37–73)
PLATELET # BLD AUTO: 150 K/UL — SIGNIFICANT CHANGE UP (ref 150–400)
PLATELET # BLD AUTO: 158 K/UL — SIGNIFICANT CHANGE UP (ref 150–400)
POTASSIUM SERPL-MCNC: 4 MMOL/L — SIGNIFICANT CHANGE UP (ref 3.5–5.3)
POTASSIUM SERPL-MCNC: 4.1 MMOL/L — SIGNIFICANT CHANGE UP (ref 3.5–5.3)
POTASSIUM SERPL-SCNC: 4 MMOL/L — SIGNIFICANT CHANGE UP (ref 3.5–5.3)
POTASSIUM SERPL-SCNC: 4.1 MMOL/L — SIGNIFICANT CHANGE UP (ref 3.5–5.3)
PROT ?TM UR-MCNC: 11 MG/DL — SIGNIFICANT CHANGE UP (ref 0–12)
PROT ?TM UR-MCNC: 6 MG/DL — SIGNIFICANT CHANGE UP (ref 0–12)
PROT SERPL-MCNC: 6.6 G/DL — SIGNIFICANT CHANGE UP (ref 6.6–8.7)
PROT SERPL-MCNC: 6.6 G/DL — SIGNIFICANT CHANGE UP (ref 6.6–8.7)
PROT/CREAT UR-RTO: 0.1 RATIO — SIGNIFICANT CHANGE UP
PROT/CREAT UR-RTO: 0.4 RATIO — HIGH
PROTHROM AB SERPL-ACNC: 10.4 SEC — SIGNIFICANT CHANGE UP (ref 9.8–12.7)
PROTHROM AB SERPL-ACNC: 10.8 SEC — SIGNIFICANT CHANGE UP (ref 9.8–12.7)
RBC # BLD: 4.55 M/UL — SIGNIFICANT CHANGE UP (ref 4.4–5.2)
RBC # BLD: 4.62 M/UL — SIGNIFICANT CHANGE UP (ref 4.4–5.2)
RBC # FLD: 13.3 % — SIGNIFICANT CHANGE UP (ref 11–15.6)
RBC # FLD: 13.3 % — SIGNIFICANT CHANGE UP (ref 11–15.6)
SODIUM SERPL-SCNC: 132 MMOL/L — LOW (ref 135–145)
SODIUM SERPL-SCNC: 139 MMOL/L — SIGNIFICANT CHANGE UP (ref 135–145)
URATE SERPL-MCNC: 5.7 MG/DL — SIGNIFICANT CHANGE UP (ref 2.4–5.7)
URATE SERPL-MCNC: 5.8 MG/DL — HIGH (ref 2.4–5.7)
URATE SERPL-MCNC: 5.8 MG/DL — HIGH (ref 2.4–5.7)
WBC # BLD: 11.6 K/UL — HIGH (ref 4.8–10.8)
WBC # BLD: 12.8 K/UL — HIGH (ref 4.8–10.8)
WBC # FLD AUTO: 11.6 K/UL — HIGH (ref 4.8–10.8)
WBC # FLD AUTO: 12.8 K/UL — HIGH (ref 4.8–10.8)

## 2018-09-02 RX ORDER — PENICILLIN G POTASSIUM 5000000 [IU]/1
2.5 POWDER, FOR SOLUTION INTRAMUSCULAR; INTRAPLEURAL; INTRATHECAL; INTRAVENOUS EVERY 4 HOURS
Qty: 0 | Refills: 0 | Status: DISCONTINUED | OUTPATIENT
Start: 2018-09-02 | End: 2018-09-04

## 2018-09-02 RX ORDER — CITRIC ACID/SODIUM CITRATE 300-500 MG
30 SOLUTION, ORAL ORAL ONCE
Qty: 0 | Refills: 0 | Status: DISCONTINUED | OUTPATIENT
Start: 2018-09-02 | End: 2018-09-03

## 2018-09-02 RX ORDER — LABETALOL HCL 100 MG
20 TABLET ORAL ONCE
Qty: 0 | Refills: 0 | Status: COMPLETED | OUTPATIENT
Start: 2018-09-02 | End: 2018-09-02

## 2018-09-02 RX ORDER — PENICILLIN G POTASSIUM 5000000 [IU]/1
POWDER, FOR SOLUTION INTRAMUSCULAR; INTRAPLEURAL; INTRATHECAL; INTRAVENOUS
Qty: 0 | Refills: 0 | Status: DISCONTINUED | OUTPATIENT
Start: 2018-09-02 | End: 2018-09-04

## 2018-09-02 RX ORDER — INSULIN DETEMIR 100/ML (3)
20 INSULIN PEN (ML) SUBCUTANEOUS AT BEDTIME
Qty: 0 | Refills: 0 | Status: DISCONTINUED | OUTPATIENT
Start: 2018-09-02 | End: 2018-09-05

## 2018-09-02 RX ORDER — SODIUM CHLORIDE 9 MG/ML
1000 INJECTION, SOLUTION INTRAVENOUS ONCE
Qty: 0 | Refills: 0 | Status: COMPLETED | OUTPATIENT
Start: 2018-09-02 | End: 2018-09-03

## 2018-09-02 RX ORDER — PENICILLIN G POTASSIUM 5000000 [IU]/1
5 POWDER, FOR SOLUTION INTRAMUSCULAR; INTRAPLEURAL; INTRATHECAL; INTRAVENOUS ONCE
Qty: 0 | Refills: 0 | Status: COMPLETED | OUTPATIENT
Start: 2018-09-02 | End: 2018-09-02

## 2018-09-02 RX ORDER — OXYTOCIN 10 UNIT/ML
333.33 VIAL (ML) INJECTION
Qty: 20 | Refills: 0 | Status: DISCONTINUED | OUTPATIENT
Start: 2018-09-02 | End: 2018-09-03

## 2018-09-02 RX ORDER — SODIUM CHLORIDE 9 MG/ML
1000 INJECTION, SOLUTION INTRAVENOUS
Qty: 0 | Refills: 0 | Status: DISCONTINUED | OUTPATIENT
Start: 2018-09-02 | End: 2018-09-03

## 2018-09-02 RX ORDER — MAGNESIUM SULFATE 500 MG/ML
1 VIAL (ML) INJECTION
Qty: 40 | Refills: 0 | Status: DISCONTINUED | OUTPATIENT
Start: 2018-09-02 | End: 2018-09-04

## 2018-09-02 RX ORDER — MAGNESIUM SULFATE 500 MG/ML
4 VIAL (ML) INJECTION ONCE
Qty: 0 | Refills: 0 | Status: COMPLETED | OUTPATIENT
Start: 2018-09-02 | End: 2018-09-02

## 2018-09-02 RX ADMIN — Medication: at 10:41

## 2018-09-02 RX ADMIN — Medication 1 TABLET(S): at 13:35

## 2018-09-02 RX ADMIN — PENICILLIN G POTASSIUM 200 MILLION UNIT(S): 5000000 POWDER, FOR SOLUTION INTRAMUSCULAR; INTRAPLEURAL; INTRATHECAL; INTRAVENOUS at 21:35

## 2018-09-02 RX ADMIN — Medication 1 MILLIGRAM(S): at 13:34

## 2018-09-02 RX ADMIN — PENICILLIN G POTASSIUM 200 MILLION UNIT(S): 5000000 POWDER, FOR SOLUTION INTRAMUSCULAR; INTRAPLEURAL; INTRATHECAL; INTRAVENOUS at 13:42

## 2018-09-02 RX ADMIN — Medication 300 GRAM(S): at 16:39

## 2018-09-02 RX ADMIN — Medication 20 UNIT(S): at 22:58

## 2018-09-02 RX ADMIN — Medication 20 MILLIGRAM(S): at 16:27

## 2018-09-02 RX ADMIN — Medication 30 UNIT(S): at 08:59

## 2018-09-02 RX ADMIN — Medication 50 GM/HR: at 20:15

## 2018-09-02 RX ADMIN — SODIUM CHLORIDE 125 MILLILITER(S): 9 INJECTION, SOLUTION INTRAVENOUS at 20:15

## 2018-09-02 RX ADMIN — PENICILLIN G POTASSIUM 200 MILLION UNIT(S): 5000000 POWDER, FOR SOLUTION INTRAMUSCULAR; INTRAPLEURAL; INTRATHECAL; INTRAVENOUS at 17:35

## 2018-09-02 NOTE — PROGRESS NOTE ADULT - ASSESSMENT
23y   CHRISSY 18 by 1st trimester US at 37 1/7 wks GA with type 1 diabetes here for glycemic optimization prior to IOL. Glycemic control improved. One episode of hypoglycemia recorded or experienced. 1 hr post lunch was high, and her pre-meals are slightly higher than ideal but much improved. Plan for IOL today per MFM.

## 2018-09-02 NOTE — PROGRESS NOTE ADULT - SUBJECTIVE AND OBJECTIVE BOX
A 23y   CHRISSY 18 by 1st trimester US at 37 1/7 wks GA with type 1 diabetes, poorly controlled, here for glucose control and induction of labor. She is s/p betamethasone course.     No issues overnight. + FM, no LOF/VB. No calf pain or SOB. Pt reports intermittent contractions in no particular pattern. Denies feeling like she was hypoglycemic yesterday at all.     T(C): 36.8 (01 Sep 2018 09:09), Max: 36.8 (01 Sep 2018 09:09)  T(F): 98.2 (01 Sep 2018 09:09), Max: 98.2 (01 Sep 2018 09:09)  HR: 85 (01 Sep 2018 09:09) (85 - 85)  BP: 139/89 (01 Sep 2018 09:09) (139/89 - 139/89)  RR: 20 (01 Sep 2018 09:09) (20 - 20)        PE: No acute distress   Pulm: CTABL,  Abd: soft non-tender, gravid, no contractions palpated   Ext: trace edema bilaterally, no pitting, neg homans        Lab:   CAPILLARY BLOOD GLUCOSE      POCT  Blood Glucose (18 @ 23:11)    POCT Blood Glucose.: 193 mg/dL    POCT  Blood Glucose (18 @ 22:13)    POCT Blood Glucose.: 51 mg/dL    POCT  Blood Glucose (18 @ 18:48)    POCT Blood Glucose.: 138 mg/dL    POCT  Blood Glucose (18 @ 17:09)    POCT Blood Glucose.: 96 mg/dL    POCT  Blood Glucose (18 @ 14:17)    POCT Blood Glucose.: 244 mg/dL    POCT  Blood Glucose (18 @ 12:44)    POCT Blood Glucose.: 171 mg/dL    POCT  Blood Glucose (18 @ 10:33)    POCT Blood Glucose.: 245 mg/dL    POCT  Blood Glucose (18 @ 09:01)    POCT Blood Glucose.: 179 mg/dL

## 2018-09-03 DIAGNOSIS — O14.13 SEVERE PRE-ECLAMPSIA, THIRD TRIMESTER: ICD-10-CM

## 2018-09-03 LAB
ALBUMIN SERPL ELPH-MCNC: 3.4 G/DL — SIGNIFICANT CHANGE UP (ref 3.3–5.2)
ALBUMIN SERPL ELPH-MCNC: 3.5 G/DL — SIGNIFICANT CHANGE UP (ref 3.3–5.2)
ALP SERPL-CCNC: 200 U/L — HIGH (ref 40–120)
ALP SERPL-CCNC: 206 U/L — HIGH (ref 40–120)
ALT FLD-CCNC: 63 U/L — HIGH
ALT FLD-CCNC: 74 U/L — HIGH
ANION GAP SERPL CALC-SCNC: 15 MMOL/L — SIGNIFICANT CHANGE UP (ref 5–17)
ANION GAP SERPL CALC-SCNC: 17 MMOL/L — SIGNIFICANT CHANGE UP (ref 5–17)
APTT BLD: 23.3 SEC — LOW (ref 27.5–37.4)
APTT BLD: 24.3 SEC — LOW (ref 27.5–37.4)
APTT BLD: 26.3 SEC — LOW (ref 27.5–37.4)
AST SERPL-CCNC: 125 U/L — HIGH
AST SERPL-CCNC: 153 U/L — HIGH
BASE EXCESS BLDCOA CALC-SCNC: 0.7 MMOL/L — SIGNIFICANT CHANGE UP (ref -2–2)
BASE EXCESS BLDCOV CALC-SCNC: 0.4 MMOL/L — SIGNIFICANT CHANGE UP (ref -2–2)
BASOPHILS # BLD AUTO: 0 K/UL — SIGNIFICANT CHANGE UP (ref 0–0.2)
BASOPHILS NFR BLD AUTO: 0.1 % — SIGNIFICANT CHANGE UP (ref 0–2)
BASOPHILS NFR BLD AUTO: 0.2 % — SIGNIFICANT CHANGE UP (ref 0–2)
BASOPHILS NFR BLD AUTO: 0.2 % — SIGNIFICANT CHANGE UP (ref 0–2)
BILIRUB SERPL-MCNC: 0.2 MG/DL — LOW (ref 0.4–2)
BILIRUB SERPL-MCNC: <0.2 MG/DL — LOW (ref 0.4–2)
BLD GP AB SCN SERPL QL: SIGNIFICANT CHANGE UP
BUN SERPL-MCNC: 13 MG/DL — SIGNIFICANT CHANGE UP (ref 8–20)
BUN SERPL-MCNC: 14 MG/DL — SIGNIFICANT CHANGE UP (ref 8–20)
CALCIUM SERPL-MCNC: 8.3 MG/DL — LOW (ref 8.6–10.2)
CALCIUM SERPL-MCNC: 8.6 MG/DL — SIGNIFICANT CHANGE UP (ref 8.6–10.2)
CHLORIDE SERPL-SCNC: 95 MMOL/L — LOW (ref 98–107)
CHLORIDE SERPL-SCNC: 96 MMOL/L — LOW (ref 98–107)
CO2 SERPL-SCNC: 20 MMOL/L — LOW (ref 22–29)
CO2 SERPL-SCNC: 22 MMOL/L — SIGNIFICANT CHANGE UP (ref 22–29)
CREAT ?TM UR-MCNC: 33 MG/DL — SIGNIFICANT CHANGE UP
CREAT SERPL-MCNC: 0.92 MG/DL — SIGNIFICANT CHANGE UP (ref 0.5–1.3)
CREAT SERPL-MCNC: 0.96 MG/DL — SIGNIFICANT CHANGE UP (ref 0.5–1.3)
EOSINOPHIL # BLD AUTO: 0 K/UL — SIGNIFICANT CHANGE UP (ref 0–0.5)
EOSINOPHIL NFR BLD AUTO: 0.2 % — SIGNIFICANT CHANGE UP (ref 0–6)
EOSINOPHIL NFR BLD AUTO: 0.2 % — SIGNIFICANT CHANGE UP (ref 0–6)
EOSINOPHIL NFR BLD AUTO: 0.3 % — SIGNIFICANT CHANGE UP (ref 0–6)
FIBRINOGEN PPP-MCNC: 617 MG/DL — HIGH (ref 310–510)
FIBRINOGEN PPP-MCNC: 639 MG/DL — HIGH (ref 310–510)
GAS PNL BLDCOV: 7.33 — SIGNIFICANT CHANGE UP (ref 7.25–7.45)
GLUCOSE BLDC GLUCOMTR-MCNC: 118 MG/DL — HIGH (ref 70–99)
GLUCOSE BLDC GLUCOMTR-MCNC: 138 MG/DL — HIGH (ref 70–99)
GLUCOSE BLDC GLUCOMTR-MCNC: 162 MG/DL — HIGH (ref 70–99)
GLUCOSE BLDC GLUCOMTR-MCNC: 169 MG/DL — HIGH (ref 70–99)
GLUCOSE SERPL-MCNC: 156 MG/DL — HIGH (ref 70–115)
GLUCOSE SERPL-MCNC: 163 MG/DL — HIGH (ref 70–115)
HCO3 BLDCOA-SCNC: 23 MMOL/L — SIGNIFICANT CHANGE UP (ref 21–29)
HCO3 BLDCOV-SCNC: 24 MMOL/L — SIGNIFICANT CHANGE UP (ref 21–29)
HCT VFR BLD CALC: 33 % — LOW (ref 37–47)
HCT VFR BLD CALC: 39 % — SIGNIFICANT CHANGE UP (ref 37–47)
HCT VFR BLD CALC: 39.6 % — SIGNIFICANT CHANGE UP (ref 37–47)
HGB BLD-MCNC: 11.1 G/DL — LOW (ref 12–16)
HGB BLD-MCNC: 12.9 G/DL — SIGNIFICANT CHANGE UP (ref 12–16)
HGB BLD-MCNC: 13.1 G/DL — SIGNIFICANT CHANGE UP (ref 12–16)
INR BLD: 0.92 RATIO — SIGNIFICANT CHANGE UP (ref 0.88–1.16)
INR BLD: 0.95 RATIO — SIGNIFICANT CHANGE UP (ref 0.88–1.16)
LDH SERPL L TO P-CCNC: 323 U/L — HIGH (ref 98–192)
LDH SERPL L TO P-CCNC: 364 U/L — HIGH (ref 98–192)
LYMPHOCYTES # BLD AUTO: 1.7 K/UL — SIGNIFICANT CHANGE UP (ref 1–4.8)
LYMPHOCYTES # BLD AUTO: 13.9 % — LOW (ref 20–55)
LYMPHOCYTES # BLD AUTO: 14.7 % — LOW (ref 20–55)
LYMPHOCYTES # BLD AUTO: 16.5 % — LOW (ref 20–55)
MAGNESIUM SERPL-MCNC: 5.5 MG/DL — HIGH (ref 1.6–2.6)
MAGNESIUM SERPL-MCNC: 6.5 MG/DL — HIGH (ref 1.6–2.6)
MAGNESIUM SERPL-MCNC: 7.2 MG/DL — CRITICAL HIGH (ref 1.6–2.6)
MCHC RBC-ENTMCNC: 26.7 PG — LOW (ref 27–31)
MCHC RBC-ENTMCNC: 26.7 PG — LOW (ref 27–31)
MCHC RBC-ENTMCNC: 26.9 PG — LOW (ref 27–31)
MCHC RBC-ENTMCNC: 32.6 G/DL — SIGNIFICANT CHANGE UP (ref 32–36)
MCHC RBC-ENTMCNC: 33.6 G/DL — SIGNIFICANT CHANGE UP (ref 32–36)
MCHC RBC-ENTMCNC: 33.6 G/DL — SIGNIFICANT CHANGE UP (ref 32–36)
MCV RBC AUTO: 79.4 FL — LOW (ref 81–99)
MCV RBC AUTO: 79.5 FL — LOW (ref 81–99)
MCV RBC AUTO: 82.5 FL — SIGNIFICANT CHANGE UP (ref 81–99)
MONOCYTES # BLD AUTO: 0.8 K/UL — SIGNIFICANT CHANGE UP (ref 0–0.8)
MONOCYTES # BLD AUTO: 0.8 K/UL — SIGNIFICANT CHANGE UP (ref 0–0.8)
MONOCYTES # BLD AUTO: 1.2 K/UL — HIGH (ref 0–0.8)
MONOCYTES NFR BLD AUTO: 10.3 % — HIGH (ref 3–10)
MONOCYTES NFR BLD AUTO: 6.8 % — SIGNIFICANT CHANGE UP (ref 3–10)
MONOCYTES NFR BLD AUTO: 7.8 % — SIGNIFICANT CHANGE UP (ref 3–10)
NEUTROPHILS # BLD AUTO: 7.6 K/UL — SIGNIFICANT CHANGE UP (ref 1.8–8)
NEUTROPHILS # BLD AUTO: 8.6 K/UL — HIGH (ref 1.8–8)
NEUTROPHILS # BLD AUTO: 8.8 K/UL — HIGH (ref 1.8–8)
NEUTROPHILS NFR BLD AUTO: 72.5 % — SIGNIFICANT CHANGE UP (ref 37–73)
NEUTROPHILS NFR BLD AUTO: 73.9 % — HIGH (ref 37–73)
NEUTROPHILS NFR BLD AUTO: 76 % — HIGH (ref 37–73)
PCO2 BLDCOA: 65.1 MMHG — SIGNIFICANT CHANGE UP (ref 32–68)
PCO2 BLDCOV: 52.1 MMHG — SIGNIFICANT CHANGE UP (ref 29–53)
PH BLDCOA: 7.26 — SIGNIFICANT CHANGE UP (ref 7.18–7.38)
PLATELET # BLD AUTO: 136 K/UL — LOW (ref 150–400)
PLATELET # BLD AUTO: 143 K/UL — LOW (ref 150–400)
PLATELET # BLD AUTO: 148 K/UL — LOW (ref 150–400)
PO2 BLDCOA: 16 MMHG — SIGNIFICANT CHANGE UP (ref 5.7–30.5)
PO2 BLDCOA: 26.1 MMHG — SIGNIFICANT CHANGE UP (ref 17–41)
POTASSIUM SERPL-MCNC: 4.1 MMOL/L — SIGNIFICANT CHANGE UP (ref 3.5–5.3)
POTASSIUM SERPL-MCNC: 4.2 MMOL/L — SIGNIFICANT CHANGE UP (ref 3.5–5.3)
POTASSIUM SERPL-SCNC: 4.1 MMOL/L — SIGNIFICANT CHANGE UP (ref 3.5–5.3)
POTASSIUM SERPL-SCNC: 4.2 MMOL/L — SIGNIFICANT CHANGE UP (ref 3.5–5.3)
PROT ?TM UR-MCNC: 6 MG/DL — SIGNIFICANT CHANGE UP (ref 0–12)
PROT SERPL-MCNC: 6.9 G/DL — SIGNIFICANT CHANGE UP (ref 6.6–8.7)
PROT SERPL-MCNC: 7.2 G/DL — SIGNIFICANT CHANGE UP (ref 6.6–8.7)
PROT/CREAT UR-RTO: 0.2 RATIO — SIGNIFICANT CHANGE UP
PROTHROM AB SERPL-ACNC: 10.1 SEC — SIGNIFICANT CHANGE UP (ref 9.8–12.7)
PROTHROM AB SERPL-ACNC: 10.4 SEC — SIGNIFICANT CHANGE UP (ref 9.8–12.7)
RBC # BLD: 4.15 M/UL — LOW (ref 4.4–5.2)
RBC # BLD: 4.8 M/UL — SIGNIFICANT CHANGE UP (ref 4.4–5.2)
RBC # BLD: 4.91 M/UL — SIGNIFICANT CHANGE UP (ref 4.4–5.2)
RBC # FLD: 12.8 % — SIGNIFICANT CHANGE UP (ref 11–15.6)
RBC # FLD: 13.1 % — SIGNIFICANT CHANGE UP (ref 11–15.6)
RBC # FLD: 13.3 % — SIGNIFICANT CHANGE UP (ref 11–15.6)
SAO2 % BLDCOA: SIGNIFICANT CHANGE UP
SAO2 % BLDCOV: SIGNIFICANT CHANGE UP
SODIUM SERPL-SCNC: 132 MMOL/L — LOW (ref 135–145)
SODIUM SERPL-SCNC: 133 MMOL/L — LOW (ref 135–145)
TYPE + AB SCN PNL BLD: SIGNIFICANT CHANGE UP
URATE SERPL-MCNC: 6.4 MG/DL — HIGH (ref 2.4–5.7)
URATE SERPL-MCNC: 7 MG/DL — HIGH (ref 2.4–5.7)
WBC # BLD: 10.3 K/UL — SIGNIFICANT CHANGE UP (ref 4.8–10.8)
WBC # BLD: 11.3 K/UL — HIGH (ref 4.8–10.8)
WBC # BLD: 12.2 K/UL — HIGH (ref 4.8–10.8)
WBC # FLD AUTO: 10.3 K/UL — SIGNIFICANT CHANGE UP (ref 4.8–10.8)
WBC # FLD AUTO: 11.3 K/UL — HIGH (ref 4.8–10.8)
WBC # FLD AUTO: 12.2 K/UL — HIGH (ref 4.8–10.8)

## 2018-09-03 RX ORDER — ACETAMINOPHEN 500 MG
1000 TABLET ORAL ONCE
Qty: 0 | Refills: 0 | Status: COMPLETED | OUTPATIENT
Start: 2018-09-03 | End: 2018-09-04

## 2018-09-03 RX ORDER — CEFAZOLIN SODIUM 1 G
2000 VIAL (EA) INJECTION ONCE
Qty: 0 | Refills: 0 | Status: COMPLETED | OUTPATIENT
Start: 2018-09-03 | End: 2018-09-03

## 2018-09-03 RX ORDER — DIPHENHYDRAMINE HCL 50 MG
25 CAPSULE ORAL EVERY 4 HOURS
Qty: 0 | Refills: 0 | Status: DISCONTINUED | OUTPATIENT
Start: 2018-09-03 | End: 2018-09-06

## 2018-09-03 RX ORDER — BUTORPHANOL TARTRATE 2 MG/ML
1 INJECTION, SOLUTION INTRAMUSCULAR; INTRAVENOUS ONCE
Qty: 0 | Refills: 0 | Status: DISCONTINUED | OUTPATIENT
Start: 2018-09-03 | End: 2018-09-03

## 2018-09-03 RX ORDER — OXYTOCIN 10 UNIT/ML
41.67 VIAL (ML) INJECTION
Qty: 20 | Refills: 0 | Status: COMPLETED | OUTPATIENT
Start: 2018-09-03 | End: 2018-09-03

## 2018-09-03 RX ORDER — SODIUM CHLORIDE 9 MG/ML
1000 INJECTION, SOLUTION INTRAVENOUS
Qty: 0 | Refills: 0 | Status: DISCONTINUED | OUTPATIENT
Start: 2018-09-03 | End: 2018-09-04

## 2018-09-03 RX ORDER — ONDANSETRON 8 MG/1
4 TABLET, FILM COATED ORAL EVERY 6 HOURS
Qty: 0 | Refills: 0 | Status: DISCONTINUED | OUTPATIENT
Start: 2018-09-03 | End: 2018-09-04

## 2018-09-03 RX ORDER — KETOROLAC TROMETHAMINE 30 MG/ML
30 SYRINGE (ML) INJECTION ONCE
Qty: 0 | Refills: 0 | Status: DISCONTINUED | OUTPATIENT
Start: 2018-09-03 | End: 2018-09-03

## 2018-09-03 RX ORDER — INSULIN LISPRO 100/ML
10 VIAL (ML) SUBCUTANEOUS
Qty: 0 | Refills: 0 | Status: DISCONTINUED | OUTPATIENT
Start: 2018-09-03 | End: 2018-09-05

## 2018-09-03 RX ORDER — NALBUPHINE HYDROCHLORIDE 10 MG/ML
2.5 INJECTION, SOLUTION INTRAMUSCULAR; INTRAVENOUS; SUBCUTANEOUS EVERY 6 HOURS
Qty: 0 | Refills: 0 | Status: DISCONTINUED | OUTPATIENT
Start: 2018-09-03 | End: 2018-09-06

## 2018-09-03 RX ORDER — NALOXONE HYDROCHLORIDE 4 MG/.1ML
0.1 SPRAY NASAL
Qty: 0 | Refills: 0 | Status: DISCONTINUED | OUTPATIENT
Start: 2018-09-03 | End: 2018-09-06

## 2018-09-03 RX ORDER — HYDROMORPHONE HYDROCHLORIDE 2 MG/ML
1 INJECTION INTRAMUSCULAR; INTRAVENOUS; SUBCUTANEOUS
Qty: 0 | Refills: 0 | Status: DISCONTINUED | OUTPATIENT
Start: 2018-09-03 | End: 2018-09-06

## 2018-09-03 RX ORDER — MORPHINE SULFATE 50 MG/1
2 CAPSULE, EXTENDED RELEASE ORAL ONCE
Qty: 0 | Refills: 0 | Status: DISCONTINUED | OUTPATIENT
Start: 2018-09-03 | End: 2018-09-03

## 2018-09-03 RX ORDER — ONDANSETRON 8 MG/1
4 TABLET, FILM COATED ORAL EVERY 6 HOURS
Qty: 0 | Refills: 0 | Status: DISCONTINUED | OUTPATIENT
Start: 2018-09-03 | End: 2018-09-06

## 2018-09-03 RX ORDER — KETOROLAC TROMETHAMINE 30 MG/ML
30 SYRINGE (ML) INJECTION EVERY 6 HOURS
Qty: 0 | Refills: 0 | Status: DISCONTINUED | OUTPATIENT
Start: 2018-09-03 | End: 2018-09-04

## 2018-09-03 RX ORDER — OXYTOCIN 10 UNIT/ML
333.33 VIAL (ML) INJECTION
Qty: 20 | Refills: 0 | Status: DISCONTINUED | OUTPATIENT
Start: 2018-09-03 | End: 2018-09-04

## 2018-09-03 RX ADMIN — Medication 125 MILLIUNIT(S)/MIN: at 18:17

## 2018-09-03 RX ADMIN — PENICILLIN G POTASSIUM 200 MILLION UNIT(S): 5000000 POWDER, FOR SOLUTION INTRAMUSCULAR; INTRAPLEURAL; INTRATHECAL; INTRAVENOUS at 01:35

## 2018-09-03 RX ADMIN — PENICILLIN G POTASSIUM 200 MILLION UNIT(S): 5000000 POWDER, FOR SOLUTION INTRAMUSCULAR; INTRAPLEURAL; INTRATHECAL; INTRAVENOUS at 05:17

## 2018-09-03 RX ADMIN — MORPHINE SULFATE 2 MILLIGRAM(S): 50 CAPSULE, EXTENDED RELEASE ORAL at 07:50

## 2018-09-03 RX ADMIN — PENICILLIN G POTASSIUM 200 MILLION UNIT(S): 5000000 POWDER, FOR SOLUTION INTRAMUSCULAR; INTRAPLEURAL; INTRATHECAL; INTRAVENOUS at 10:10

## 2018-09-03 RX ADMIN — Medication 30 MILLIGRAM(S): at 14:44

## 2018-09-03 RX ADMIN — Medication 4: at 08:13

## 2018-09-03 RX ADMIN — Medication 100 MILLIGRAM(S): at 11:47

## 2018-09-03 RX ADMIN — Medication 25 GM/HR: at 13:00

## 2018-09-03 RX ADMIN — SODIUM CHLORIDE 2000 MILLILITER(S): 9 INJECTION, SOLUTION INTRAVENOUS at 10:50

## 2018-09-03 RX ADMIN — Medication 125 MILLIUNIT(S)/MIN: at 13:00

## 2018-09-03 RX ADMIN — Medication 1000 MILLIUNIT(S)/MIN: at 12:13

## 2018-09-03 RX ADMIN — NALBUPHINE HYDROCHLORIDE 2.5 MILLIGRAM(S): 10 INJECTION, SOLUTION INTRAMUSCULAR; INTRAVENOUS; SUBCUTANEOUS at 14:21

## 2018-09-03 RX ADMIN — Medication 20 UNIT(S): at 23:19

## 2018-09-03 RX ADMIN — Medication 10 UNIT(S): at 17:22

## 2018-09-03 RX ADMIN — NALBUPHINE HYDROCHLORIDE 2.5 MILLIGRAM(S): 10 INJECTION, SOLUTION INTRAMUSCULAR; INTRAVENOUS; SUBCUTANEOUS at 14:39

## 2018-09-03 RX ADMIN — Medication 30 MILLIGRAM(S): at 14:29

## 2018-09-03 RX ADMIN — MORPHINE SULFATE 2 MILLIGRAM(S): 50 CAPSULE, EXTENDED RELEASE ORAL at 08:18

## 2018-09-03 NOTE — PROGRESS NOTE ADULT - ASSESSMENT
A/P: POD / PPD # 0/1 s/p   -Pain management as needed  -cont post op care  -cont mag sulfate   -f/u Rpt labs   -venodynes for VTE ppx  -d/w dr Alford

## 2018-09-03 NOTE — PROGRESS NOTE ADULT - SUBJECTIVE AND OBJECTIVE BOX
Pt is 24yo  at her Postpartum day 0 s/p pCS, IDDM, Preeclampsia with severe features.  Patient seen at bedisde resting comfortably offers no new complaints, feels slightly drowsy. Denies HA, CP, SOB, N/V/D, dizziness, palpitations, worsening abdominal pain, worsening vaginal bleeding, or any other concerns. voiding into szymanski clr;     Vital Signs Last 24 Hrs  T(C): 36.4 (03 Sep 2018 13:00), Max: 36.4 (03 Sep 2018 13:00)  T(F): 97.5 (03 Sep 2018 13:00), Max: 97.5 (03 Sep 2018 13:00)  HR: 89 (03 Sep 2018 18:00) (82 - 91)  BP: 134/98 (03 Sep 2018 18:00) (114/82 - 151/95)  RR: 18 (03 Sep 2018 18:00) (17 - 22)  SpO2: 100% (03 Sep 2018 18:00) (99% - 100%)   urinary output approx 100cc at 19:00 and 75cc at 20:00    Gen: A&O x 3, NAD  Chest: CTA B/L  Cardiac: S1,S2 RRR  Breast: Soft, nontender, nonengorged  Abdomen: +BS; soft; Nontender, nondistended  Gyn: Minimal lochia  Extremities: Nontender, DTRS 2+ b/l; edema 2+b/l; negative clonus; venodynes in place                          11.1   10.3  )-----------( 136      ( 03 Sep 2018 19:02 )             33.0         132<L>  |  95<L>  |  14.0  ----------------------------<  156<H>  4.2   |  22.0  |  0.96    Ca    8.3<L>      03 Sep 2018 09:32  Mg     5.5         TPro  6.9  /  Alb  3.4  /  TBili  0.2<L>  /  DBili  x   /  AST  125<H>  /  ALT  63<H>  /  AlkPhos  206<H>      LIVER FUNCTIONS - ( 03 Sep 2018 09:32 )  Alb: 3.4 g/dL / Pro: 6.9 g/dL / ALK PHOS: 206 U/L / ALT: 63 U/L / AST: 125 U/L / GGT: x           PT/INR - ( 03 Sep 2018 09:32 )   PT: 10.1 sec;   INR: 0.92 ratio         PTT - ( 03 Sep 2018 09:32 )  PTT:26.3 sec

## 2018-09-03 NOTE — PROGRESS NOTE ADULT - ASSESSMENT
22 YO F  pCS at 37 2/7 gestational age, on MgSO4 for preeclampsia.  No signs/symptoms of Mg toxicity    1. Preeclampsia   -Continue w/ Mg checks Q3H  -F/u Mg serum level   -Repeat PEC labs Q6H

## 2018-09-03 NOTE — PROGRESS NOTE ADULT - SUBJECTIVE AND OBJECTIVE BOX
PPD#0     S: Patient doing well. NAD. Pain controlled.  Denies SOB, HA, visual disturbances, RUQ pain, n/v, dizziness    Urine output: 120cc every hour       O: Vital Signs Last 24 Hrs  T(C): 37.1 (03 Sep 2018 19:00), Max: 37.1 (03 Sep 2018 19:00)  T(F): 98.8 (03 Sep 2018 19:00), Max: 98.8 (03 Sep 2018 19:00)  HR: 93 (03 Sep 2018 22:00) (82 - 93)  BP: 137/97 (03 Sep 2018 22:00) (114/82 - 151/95)  BP(mean): --  RR: 16 (03 Sep 2018 22:00) (16 - 22)  SpO2: 100% (03 Sep 2018 22:00) (98% - 100%)      Gen: NAD  Cardio: RRR, Normal S1/S2  Abd: soft, NT, ND, fundus firm below umbilicus  Ext: no tenderness, +2 reflexes b/l    Labs:                        11.1   10.3  )-----------( 136      ( 03 Sep 2018 19:02 )             33.0     Ma.5 PPD#0     S: Patient doing well. NAD. Pain controlled.  Denies SOB, HA, visual disturbances, RUQ pain, n/v, dizziness    Urine output: 265cc       O: Vital Signs Last 24 Hrs  T(C): 37.1 (03 Sep 2018 19:00), Max: 37.1 (03 Sep 2018 19:00)  T(F): 98.8 (03 Sep 2018 19:00), Max: 98.8 (03 Sep 2018 19:00)  HR: 93 (03 Sep 2018 22:00) (82 - 93)  BP: 137/97 (03 Sep 2018 22:00) (114/82 - 151/95)  BP(mean): --  RR: 16 (03 Sep 2018 22:00) (16 - 22)  SpO2: 100% (03 Sep 2018 22:00) (98% - 100%)      Gen: NAD  Cardio: RRR, Normal S1/S2  Abd: soft, NT, ND, fundus firm below umbilicus  Ext: no tenderness, +2 reflexes b/l    Labs:                        11.1   10.3  )-----------( 136      ( 03 Sep 2018 19:02 )             33.0     Ma.5 PPD#0     S: Patient doing well. NAD. Pain controlled.  Denies SOB, HA, visual disturbances, RUQ pain, n/v, dizziness    Urine output: 265cc for last 2 hours       O: Vital Signs Last 24 Hrs  T(C): 37.1 (03 Sep 2018 19:00), Max: 37.1 (03 Sep 2018 19:00)  T(F): 98.8 (03 Sep 2018 19:00), Max: 98.8 (03 Sep 2018 19:00)  HR: 93 (03 Sep 2018 22:00) (82 - 93)  BP: 137/97 (03 Sep 2018 22:00) (114/82 - 151/95)  RR: 16 (03 Sep 2018 22:00) (16 - 22)  SpO2: 100% (03 Sep 2018 22:00) (98% - 100%)      Gen: NAD  Cardio: RRR, Normal S1/S2  Abd: soft, NT, ND, fundus firm below umbilicus  Ext: no tenderness, +2 reflexes b/l    Labs:                        11.1   10.3  )-----------( 136      ( 03 Sep 2018 19:02 )             33.0     Ma.5 PPD#0     S: Patient doing well. NAD. Pain controlled.  Denies SOB, HA, visual disturbances, RUQ pain, n/v, dizziness    Urine output: 265cc for last 2 hours       O: Vital Signs Last 24 Hrs  T(C): 37.1 (03 Sep 2018 19:00), Max: 37.1 (03 Sep 2018 19:00)  T(F): 98.8 (03 Sep 2018 19:00), Max: 98.8 (03 Sep 2018 19:00)  HR: 93 (03 Sep 2018 22:00) (82 - 93)  BP: 137/97 (03 Sep 2018 22:00) (114/82 - 151/95)  RR: 16 (03 Sep 2018 22:00) (16 - 22)  SpO2: 100% (03 Sep 2018 22:00) (98% - 100%)      Gen: NAD  Cardio: RRR, Normal S1/S2  Abd: soft, NT, ND, fundus firm below umbilicus  Ext: no tenderness, +2 reflexes b/l    Labs:                        11.1   10.3  )-----------( 136      ( 03 Sep 2018 19:02 )             33.0     Ma.5  Cr: 0.96  AST: 125  ALT: 63

## 2018-09-03 NOTE — CHART NOTE - NSCHARTNOTEFT_GEN_A_CORE
Name: IVY AMADO  MRN: [id number]  Date Admitted: 18  Location: [facility] [id number] [id number] ([facility])  Attending: Yessi Conner    All: No Known Allergies    Mag Note    IVY AMADO is a 23y  s/p uncomplicated primary CS POD #0 due to failed induction and pre-eclampsia with severe features.    SUBJECTIVE:  Pt seen and examined at bedside. Denies HA, changes in vision, SOB, or RUQ pain.    OBJECTIVE:  Physical exam:  General: AOx3, NAD.  Heart: RRR. S1S2.  Lungs: CTABL. no WRR  Neuro: DTRs 3+  Urine Output: 260cc since 1500    Vital Signs Last 24 Hrs  T(C): 36.4 (03 Sep 2018 13:00), Max: 36.4 (03 Sep 2018 13:00)  T(F): 97.5 (03 Sep 2018 13:00), Max: 97.5 (03 Sep 2018 13:00)  HR: 88 (03 Sep 2018 15:00) (83 - 91)  BP: 142/92 (03 Sep 2018 15:00) (114/82 - 142/105)  RR: 18 (03 Sep 2018 15:00) (17 - 22)  SpO2: 100% (03 Sep 2018 15:00) (99% - 100%)    LABS:  Magnesium, Serum (18 @ 09:32)    Magnesium, Serum: 6.5 mg/dL    Complete Blood Count + Automated Diff (18 @ 09:32)    WBC Count: 11.3 K/uL    RBC Count: 4.91 M/uL    Hemoglobin: 13.1 g/dL    Hematocrit: 39.0 %    Mean Cell Volume: 79.4 fl    Mean Cell Hemoglobin: 26.7 pg    Mean Cell Hemoglobin Conc: 33.6 g/dL    Red Cell Distrib Width: 13.1 %    Platelet Count - Automated: 148: specimen checked for clots K/uL    Auto Neutrophil #: 8.6 K/uL    Auto Lymphocyte #: 1.7 K/uL    Auto Monocyte #: 0.8 K/uL    Auto Eosinophil #: 0.0 K/uL    Auto Basophil #: 0.0 K/uL    Auto Neutrophil %: 76.0: Differential percentages must be correlated with absolute numbers for  clinical significance. %    Auto Lymphocyte %: 14.7 %    Auto Monocyte %: 6.8 %    Auto Eosinophil %: 0.3 %    Auto Basophil %: 0.2 %    Comprehensive Metabolic Panel (18 @ 09:32)    Sodium, Serum: 132 mmol/L    Potassium, Serum: 4.2 mmol/L    Chloride, Serum: 95 mmol/L    Carbon Dioxide, Serum: 22.0 mmol/L    Anion Gap, Serum: 15 mmol/L    Blood Urea Nitrogen, Serum: 14.0 mg/dL    Creatinine, Serum: 0.96 mg/dL    Glucose, Serum: 156 mg/dL    Calcium, Total Serum: 8.3 mg/dL    Protein Total, Serum: 6.9 g/dL    Albumin, Serum: 3.4 g/dL    Bilirubin Total, Serum: 0.2 mg/dL    Alkaline Phosphatase, Serum: 206 U/L    Aspartate Aminotransferase (AST/SGOT): 125 U/L    Alanine Aminotransferase (ALT/SGPT): 63 U/L    eGFR if Non : 85: Interpretative comment    A/P  24yo  s/p primary CS at 37+2 for pre-eclampsia with SF and failed induction of labor. S/P BTMZ x2. S/P 1x Labetalol 20mg for meeting HTN protocol on .  - PEC w/ SF - last [Mag] 7.2. LFTs were uptrending, now stable. Cr continues to climb, from 0.66 on admission to 0.96. Next PEC-L and Mag draw at 1900. Continue treatment with Magnesium.  - Mag to be DCed on  at 1300.  - Type 1 DM - 20U levimir at bed time, 10U humulog before meals.

## 2018-09-04 ENCOUNTER — APPOINTMENT (OUTPATIENT)
Dept: ANTEPARTUM | Facility: CLINIC | Age: 23
End: 2018-09-04

## 2018-09-04 ENCOUNTER — TRANSCRIPTION ENCOUNTER (OUTPATIENT)
Age: 23
End: 2018-09-04

## 2018-09-04 ENCOUNTER — APPOINTMENT (OUTPATIENT)
Dept: MATERNAL FETAL MEDICINE | Facility: CLINIC | Age: 23
End: 2018-09-04

## 2018-09-04 LAB
ALBUMIN SERPL ELPH-MCNC: 2.6 G/DL — LOW (ref 3.3–5.2)
ALP SERPL-CCNC: 143 U/L — HIGH (ref 40–120)
ALT FLD-CCNC: 30 U/L — SIGNIFICANT CHANGE UP
ANION GAP SERPL CALC-SCNC: 13 MMOL/L — SIGNIFICANT CHANGE UP (ref 5–17)
APTT BLD: 24.9 SEC — LOW (ref 27.5–37.4)
AST SERPL-CCNC: 48 U/L — HIGH
BASOPHILS # BLD AUTO: 0 K/UL — SIGNIFICANT CHANGE UP (ref 0–0.2)
BASOPHILS NFR BLD AUTO: 0.1 % — SIGNIFICANT CHANGE UP (ref 0–2)
BILIRUB SERPL-MCNC: <0.2 MG/DL — LOW (ref 0.4–2)
BUN SERPL-MCNC: 15 MG/DL — SIGNIFICANT CHANGE UP (ref 8–20)
CALCIUM SERPL-MCNC: 7.6 MG/DL — LOW (ref 8.6–10.2)
CHLORIDE SERPL-SCNC: 97 MMOL/L — LOW (ref 98–107)
CO2 SERPL-SCNC: 22 MMOL/L — SIGNIFICANT CHANGE UP (ref 22–29)
CREAT SERPL-MCNC: 0.85 MG/DL — SIGNIFICANT CHANGE UP (ref 0.5–1.3)
EOSINOPHIL # BLD AUTO: 0 K/UL — SIGNIFICANT CHANGE UP (ref 0–0.5)
EOSINOPHIL NFR BLD AUTO: 0.2 % — SIGNIFICANT CHANGE UP (ref 0–6)
FIBRINOGEN PPP-MCNC: 638 MG/DL — HIGH (ref 310–510)
GLUCOSE BLDC GLUCOMTR-MCNC: 105 MG/DL — HIGH (ref 70–99)
GLUCOSE BLDC GLUCOMTR-MCNC: 106 MG/DL — HIGH (ref 70–99)
GLUCOSE BLDC GLUCOMTR-MCNC: 151 MG/DL — HIGH (ref 70–99)
GLUCOSE BLDC GLUCOMTR-MCNC: 190 MG/DL — HIGH (ref 70–99)
GLUCOSE SERPL-MCNC: 159 MG/DL — HIGH (ref 70–115)
HCT VFR BLD CALC: 29.2 % — LOW (ref 37–47)
HGB BLD-MCNC: 9.5 G/DL — LOW (ref 12–16)
INR BLD: 0.96 RATIO — SIGNIFICANT CHANGE UP (ref 0.88–1.16)
LDH SERPL L TO P-CCNC: 370 U/L — HIGH (ref 98–192)
LYMPHOCYTES # BLD AUTO: 1.6 K/UL — SIGNIFICANT CHANGE UP (ref 1–4.8)
LYMPHOCYTES # BLD AUTO: 15.5 % — LOW (ref 20–55)
MAGNESIUM SERPL-MCNC: 4.8 MG/DL — HIGH (ref 1.6–2.6)
MCHC RBC-ENTMCNC: 26.4 PG — LOW (ref 27–31)
MCHC RBC-ENTMCNC: 32.5 G/DL — SIGNIFICANT CHANGE UP (ref 32–36)
MCV RBC AUTO: 81.1 FL — SIGNIFICANT CHANGE UP (ref 81–99)
MONOCYTES # BLD AUTO: 0.9 K/UL — HIGH (ref 0–0.8)
MONOCYTES NFR BLD AUTO: 8.2 % — SIGNIFICANT CHANGE UP (ref 3–10)
NEUTROPHILS # BLD AUTO: 7.9 K/UL — SIGNIFICANT CHANGE UP (ref 1.8–8)
NEUTROPHILS NFR BLD AUTO: 75 % — HIGH (ref 37–73)
PLATELET # BLD AUTO: 137 K/UL — LOW (ref 150–400)
POTASSIUM SERPL-MCNC: 4.3 MMOL/L — SIGNIFICANT CHANGE UP (ref 3.5–5.3)
POTASSIUM SERPL-SCNC: 4.3 MMOL/L — SIGNIFICANT CHANGE UP (ref 3.5–5.3)
PROT SERPL-MCNC: 5.2 G/DL — LOW (ref 6.6–8.7)
PROTHROM AB SERPL-ACNC: 10.6 SEC — SIGNIFICANT CHANGE UP (ref 9.8–12.7)
RBC # BLD: 3.6 M/UL — LOW (ref 4.4–5.2)
RBC # FLD: 12.9 % — SIGNIFICANT CHANGE UP (ref 11–15.6)
SODIUM SERPL-SCNC: 132 MMOL/L — LOW (ref 135–145)
URATE SERPL-MCNC: 7.6 MG/DL — HIGH (ref 2.4–5.7)
WBC # BLD: 10.5 K/UL — SIGNIFICANT CHANGE UP (ref 4.8–10.8)
WBC # FLD AUTO: 10.5 K/UL — SIGNIFICANT CHANGE UP (ref 4.8–10.8)

## 2018-09-04 RX ORDER — IBUPROFEN 200 MG
600 TABLET ORAL EVERY 6 HOURS
Qty: 0 | Refills: 0 | Status: DISCONTINUED | OUTPATIENT
Start: 2018-09-04 | End: 2018-09-06

## 2018-09-04 RX ORDER — OXYCODONE AND ACETAMINOPHEN 5; 325 MG/1; MG/1
1 TABLET ORAL EVERY 4 HOURS
Qty: 0 | Refills: 0 | Status: DISCONTINUED | OUTPATIENT
Start: 2018-09-04 | End: 2018-09-06

## 2018-09-04 RX ORDER — OXYCODONE AND ACETAMINOPHEN 5; 325 MG/1; MG/1
2 TABLET ORAL EVERY 6 HOURS
Qty: 0 | Refills: 0 | Status: DISCONTINUED | OUTPATIENT
Start: 2018-09-04 | End: 2018-09-06

## 2018-09-04 RX ADMIN — Medication 10 UNIT(S): at 07:56

## 2018-09-04 RX ADMIN — Medication 20 UNIT(S): at 21:04

## 2018-09-04 RX ADMIN — Medication 600 MILLIGRAM(S): at 21:34

## 2018-09-04 RX ADMIN — Medication 30 MILLIGRAM(S): at 09:50

## 2018-09-04 RX ADMIN — Medication 600 MILLIGRAM(S): at 21:04

## 2018-09-04 RX ADMIN — Medication 400 MILLIGRAM(S): at 03:24

## 2018-09-04 RX ADMIN — Medication 10 UNIT(S): at 11:23

## 2018-09-04 RX ADMIN — OXYCODONE AND ACETAMINOPHEN 2 TABLET(S): 5; 325 TABLET ORAL at 13:03

## 2018-09-04 RX ADMIN — Medication 600 MILLIGRAM(S): at 16:00

## 2018-09-04 RX ADMIN — Medication 1 TABLET(S): at 11:23

## 2018-09-04 RX ADMIN — OXYCODONE AND ACETAMINOPHEN 2 TABLET(S): 5; 325 TABLET ORAL at 18:54

## 2018-09-04 RX ADMIN — Medication 10 UNIT(S): at 16:37

## 2018-09-04 RX ADMIN — OXYCODONE AND ACETAMINOPHEN 2 TABLET(S): 5; 325 TABLET ORAL at 18:12

## 2018-09-04 RX ADMIN — OXYCODONE AND ACETAMINOPHEN 2 TABLET(S): 5; 325 TABLET ORAL at 12:12

## 2018-09-04 RX ADMIN — Medication 1 MILLIGRAM(S): at 11:23

## 2018-09-04 RX ADMIN — Medication 30 MILLIGRAM(S): at 08:56

## 2018-09-04 RX ADMIN — Medication 1000 MILLIGRAM(S): at 03:49

## 2018-09-04 RX ADMIN — Medication 600 MILLIGRAM(S): at 15:10

## 2018-09-04 NOTE — DISCHARGE NOTE OB - MATERIALS PROVIDED
[facility] [place] Screening Program/Bottle Feeding Log/Vaccinations/Guide to Postpartum Care/Back To Sleep Handout/Shaken Baby Prevention Handout/Tdap Vaccination (VIS Pub Date: 2012)

## 2018-09-04 NOTE — DISCHARGE NOTE OB - MEDICATION SUMMARY - MEDICATIONS TO TAKE
I will START or STAY ON the medications listed below when I get home from the hospital:    ibuprofen 600 mg oral tablet  -- 1 tab(s) by mouth every 6 hours, As needed, Mild Pain (1 - 3), Moderate Pain (4 - 6  -- Indication: For moderate pain I will START or STAY ON the medications listed below when I get home from the hospital:    ibuprofen 600 mg oral tablet  -- 1 tab(s) by mouth every 6 hours, As needed, Mild Pain (1 - 3), Moderate Pain (4 - 6  -- Indication: For moderate pain    Lantus Solostar Pen 100 units/mL subcutaneous solution  -- 15 unit(s) subcutaneous once a day (at bedtime)  Please give patient a box of pen needles.   -- Do not drink alcoholic beverages when taking this medication.  It is very important that you take or use this exactly as directed.  Do not skip doses or discontinue unless directed by your doctor.  Keep in refrigerator.  Do not freeze.    -- Indication: For Diabetes control, Type 1    insulin lispro 100 units/mL injectable solution  -- 5 milliliter(s) injectable 3 times a day BEFORE each meal. Please gives patient pens.   -- Indication: For Diabetes control, Type 1

## 2018-09-04 NOTE — CHART NOTE - NSCHARTNOTEFT_GEN_A_CORE
post  day # 1  Preeclampsia with severe features remote from delivery  other risk factor: IDDM    no complaints    BP 120s-130s/80s  urine output 400cc/hr    labs: LFTs now 40s/30s  other preeclamptic labs wnl    Plan:   discontinue mag  stable for transfer to post partum unit for continuation of care

## 2018-09-04 NOTE — PROGRESS NOTE ADULT - SUBJECTIVE AND OBJECTIVE BOX
Postpartum Note,  Section  Patient is a 23y s/p  post-operative day 1.    Subjective:  No acute events overnight. The patient is feeling well.   She is tolerating a diet and denies N/V.    Patient is having normal postpartum bleeding which is decreasing in amount.    Pt has Hill catheter in place    Physical exam:    Vital Signs Last 24 Hrs  T(C): 37.1 (03 Sep 2018 19:00), Max: 37.1 (03 Sep 2018 19:00)  T(F): 98.8 (03 Sep 2018 19:00), Max: 98.8 (03 Sep 2018 19:00)  HR: 91 (04 Sep 2018 03:00) (82 - 96)  BP: 127/87 (04 Sep 2018 03:00) (114/82 - 151/95)  RR: 16 (04 Sep 2018 03:00) (16 - 22)  SpO2: 98% (04 Sep 2018 03:00) (98% - 100%)    Heart: RRR  Lungs: CTABL  Breast: non tender, not engorged   Abdomen: Soft, moderately distended. firm uterine fundus at the umbilicus, the dressing is removed, the incision is clean and dry  Ext: No DVT signs, warm extremities    Glucose 159    LABS:                        9.5    10.5  )-----------( 137      ( 04 Sep 2018 01:17 )             29.2

## 2018-09-04 NOTE — DISCHARGE NOTE OB - PLAN OF CARE
Recovery Delivered via  delivery. Patient transferred to postpartum unit without complications during stay. Upon discharge patient is voiding, tolerating PO, ambulating and pain is controlled. Patient meet expected milestones for discharge. Patient is afebrile and hemodynamically stable. Patient desires to go home. Patient should follow up with her OB for wound check in 3-5 days. Patient should call her doctor sooner if she develops fever or uncontrolled vaginal bleeding. Please call sooner if there are any other concerns. Resolved Please follow up at [facility] in 4 weeks for postpartum care. stable Follow a low carb low sugar diet. Continue to take all antidiabetic medications/insulin as prescribed. Follow up with your Primary Care Doctor regularly for blood sugar/A1c checks. Be sure to see an eye doctor and foot doctor on an annual basis.

## 2018-09-04 NOTE — DISCHARGE NOTE OB - HOSPITAL COURSE
Patient is a 25 YO F  who experienced pCS at 37 2/7 weels. Labor course was complicated by preeclampsia with severe features, delivery was uncomplicated. Postpartum course was unremarkable. Patient was transferred to postpartum floor and monitored. Patient is medically optimized for discharge and is instructed to follow up with [facility] care in 4 weeks for postpartum care. Patient verbalizes understanding and agreement with treatment and follow up plan and is satisfied with her care. Patient is a 24 YO F  who experienced pCS at 37 2/7 weeks. Labor course was complicated by preeclampsia with severe features, delivery was uncomplicated. Postpartum course was unremarkable. Patient was transferred to postpartum floor and monitored. Patient is medically optimized for discharge and is instructed to follow up with [facility] care in 4 weeks for postpartum care. Patient verbalizes understanding and agreement with treatment and follow up plan and is satisfied with her care.

## 2018-09-04 NOTE — DISCHARGE NOTE OB - PATIENT PORTAL LINK FT
You can access the [facility] Patient Portal, offered by [facility], by registering with the following website: [email or web address]

## 2018-09-04 NOTE — ADVANCED PRACTICE NURSE CONSULT - ASSESSMENT
return to see pt in am pt is a+ox3 c/o 0 pain, resting comfortably in bed. family @ bedside providing comfort. discussed w pt the importance of maintaining good glycemic control post pregnancy in order to promote surgical site healing. also advised pt to ambulate after meals. pt was inquiring about the lower lydia of insulin, pt was educated about the placenta action during pregnancy and its effect on bg

## 2018-09-04 NOTE — DISCHARGE NOTE OB - SECONDARY DIAGNOSIS.
Preeclampsia, severe, third trimester Type 1 diabetes mellitus affecting pregnancy in third trimester, antepartum

## 2018-09-04 NOTE — DISCHARGE NOTE OB - CARE PLAN
Principal Discharge DX:	 delivery delivered  Goal:	Recovery  Assessment and plan of treatment:	Delivered via  delivery. Patient transferred to postpartum unit without complications during stay. Upon discharge patient is voiding, tolerating PO, ambulating and pain is controlled. Patient meet expected milestones for discharge. Patient is afebrile and hemodynamically stable. Patient desires to go home. Patient should follow up with her OB for wound check in 3-5 days. Patient should call her doctor sooner if she develops fever or uncontrolled vaginal bleeding. Please call sooner if there are any other concerns.  Secondary Diagnosis:	Preeclampsia, severe, third trimester  Goal:	Resolved  Assessment and plan of treatment:	Please follow up at [facility] in 4 weeks for postpartum care.  Secondary Diagnosis:	Type 1 diabetes mellitus affecting pregnancy in third trimester, antepartum  Goal:	stable  Assessment and plan of treatment:	Follow a low carb low sugar diet. Continue to take all antidiabetic medications/insulin as prescribed. Follow up with your Primary Care Doctor regularly for blood sugar/A1c checks. Be sure to see an eye doctor and foot doctor on an annual basis.

## 2018-09-04 NOTE — PROGRESS NOTE ADULT - SUBJECTIVE AND OBJECTIVE BOX
PPD#0     S: Patient doing well. NAD. Pain controlled.  Denies SOB, HA, visual disturbances, RUQ pain, n/v, dizziness    Urine output: 200 in last 2 hours      O: Vital Signs Last 24 Hrs  T(C): 37.1 (03 Sep 2018 19:00), Max: 37.1 (03 Sep 2018 19:00)  T(F): 98.8 (03 Sep 2018 19:00), Max: 98.8 (03 Sep 2018 19:00)  HR: 93 (03 Sep 2018 22:00) (82 - 93)  BP: 137/97 (03 Sep 2018 22:00) (114/82 - 151/95)  RR: 16 (03 Sep 2018 22:00) (16 - 22)  SpO2: 100% (03 Sep 2018 22:00) (98% - 100%)      Gen: NAD  Cardio: RRR, Normal S1/S2  Abd: soft, NT, ND, fundus firm below umbilicus  Ext: no tenderness, +2 reflexes b/l    Labs:                        11.1   10.3  )-----------( 136      ( 03 Sep 2018 19:02 )             33.0     Ma.8 PPD#0     S: Patient doing well. NAD. Pain controlled.  Denies SOB, HA, visual disturbances, RUQ pain, n/v, dizziness    Urine output: 200 in last 2 hours      O: Vital Signs Last 24 Hrs  T(C): 37.1 (03 Sep 2018 19:00), Max: 37.1 (03 Sep 2018 19:00)  T(F): 98.8 (03 Sep 2018 19:00), Max: 98.8 (03 Sep 2018 19:00)  HR: 93 (03 Sep 2018 22:00) (82 - 93)  BP: 137/97 (03 Sep 2018 22:00) (114/82 - 151/95)  RR: 16 (03 Sep 2018 22:00) (16 - 22)  SpO2: 100% (03 Sep 2018 22:00) (98% - 100%)      Gen: NAD  Cardio: RRR, Normal S1/S2  Abd: soft, NT, ND, fundus firm below umbilicus  Ext: no tenderness, +2 reflexes b/l    Labs:                        11.1   10.3  )-----------( 136      ( 03 Sep 2018 19:02 )             33.0     Ma.8  Cr: 0.85  AST: 48  ALT: 30

## 2018-09-04 NOTE — PROGRESS NOTE ADULT - ASSESSMENT
Section Day: 1  Patient is feeling well overall.     Continue the current pain medication.   Encourage ambulation and regular diet.   Continue DVT ppx: SCDs.   Plan to discharge on day 3 or 4 according to the normal criteria.

## 2018-09-05 LAB
GLUCOSE BLDC GLUCOMTR-MCNC: 139 MG/DL — HIGH (ref 70–99)
GLUCOSE BLDC GLUCOMTR-MCNC: 221 MG/DL — HIGH (ref 70–99)
GLUCOSE BLDC GLUCOMTR-MCNC: 56 MG/DL — LOW (ref 70–99)
GLUCOSE BLDC GLUCOMTR-MCNC: 61 MG/DL — LOW (ref 70–99)
GLUCOSE BLDC GLUCOMTR-MCNC: 95 MG/DL — SIGNIFICANT CHANGE UP (ref 70–99)

## 2018-09-05 RX ORDER — INSULIN GLARGINE 100 [IU]/ML
15 INJECTION, SOLUTION SUBCUTANEOUS AT BEDTIME
Qty: 0 | Refills: 0 | Status: DISCONTINUED | OUTPATIENT
Start: 2018-09-05 | End: 2018-09-06

## 2018-09-05 RX ORDER — INSULIN LISPRO 100/ML
5 VIAL (ML) SUBCUTANEOUS
Qty: 0 | Refills: 0 | Status: DISCONTINUED | OUTPATIENT
Start: 2018-09-05 | End: 2018-09-06

## 2018-09-05 RX ORDER — INSULIN LISPRO 100/ML
VIAL (ML) SUBCUTANEOUS AT BEDTIME
Qty: 0 | Refills: 0 | Status: DISCONTINUED | OUTPATIENT
Start: 2018-09-05 | End: 2018-09-06

## 2018-09-05 RX ORDER — INSULIN LISPRO 100/ML
32 VIAL (ML) SUBCUTANEOUS
Qty: 0 | Refills: 0 | COMMUNITY

## 2018-09-05 RX ORDER — INFLUENZA VIRUS VACCINE 15; 15; 15; 15 UG/.5ML; UG/.5ML; UG/.5ML; UG/.5ML
0.5 SUSPENSION INTRAMUSCULAR ONCE
Qty: 0 | Refills: 0 | Status: COMPLETED | OUTPATIENT
Start: 2018-09-05 | End: 2018-09-05

## 2018-09-05 RX ORDER — TETANUS TOXOID, REDUCED DIPHTHERIA TOXOID AND ACELLULAR PERTUSSIS VACCINE, ADSORBED 5; 2.5; 8; 8; 2.5 [IU]/.5ML; [IU]/.5ML; UG/.5ML; UG/.5ML; UG/.5ML
0.5 SUSPENSION INTRAMUSCULAR ONCE
Qty: 0 | Refills: 0 | Status: COMPLETED | OUTPATIENT
Start: 2018-09-05 | End: 2018-09-05

## 2018-09-05 RX ORDER — INSULIN LISPRO 100/ML
VIAL (ML) SUBCUTANEOUS
Qty: 0 | Refills: 0 | Status: DISCONTINUED | OUTPATIENT
Start: 2018-09-05 | End: 2018-09-06

## 2018-09-05 RX ORDER — IBUPROFEN 200 MG
1 TABLET ORAL
Qty: 28 | Refills: 0 | OUTPATIENT
Start: 2018-09-05 | End: 2018-09-11

## 2018-09-05 RX ADMIN — TETANUS TOXOID, REDUCED DIPHTHERIA TOXOID AND ACELLULAR PERTUSSIS VACCINE, ADSORBED 0.5 MILLILITER(S): 5; 2.5; 8; 8; 2.5 SUSPENSION INTRAMUSCULAR at 20:04

## 2018-09-05 RX ADMIN — Medication 600 MILLIGRAM(S): at 08:15

## 2018-09-05 RX ADMIN — INSULIN GLARGINE 15 UNIT(S): 100 INJECTION, SOLUTION SUBCUTANEOUS at 21:00

## 2018-09-05 RX ADMIN — Medication 600 MILLIGRAM(S): at 18:10

## 2018-09-05 RX ADMIN — OXYCODONE AND ACETAMINOPHEN 2 TABLET(S): 5; 325 TABLET ORAL at 13:15

## 2018-09-05 RX ADMIN — Medication 5 UNIT(S): at 17:10

## 2018-09-05 RX ADMIN — OXYCODONE AND ACETAMINOPHEN 2 TABLET(S): 5; 325 TABLET ORAL at 20:02

## 2018-09-05 RX ADMIN — OXYCODONE AND ACETAMINOPHEN 2 TABLET(S): 5; 325 TABLET ORAL at 02:55

## 2018-09-05 RX ADMIN — Medication 10 UNIT(S): at 12:22

## 2018-09-05 RX ADMIN — Medication 600 MILLIGRAM(S): at 17:10

## 2018-09-05 RX ADMIN — OXYCODONE AND ACETAMINOPHEN 2 TABLET(S): 5; 325 TABLET ORAL at 12:19

## 2018-09-05 RX ADMIN — OXYCODONE AND ACETAMINOPHEN 2 TABLET(S): 5; 325 TABLET ORAL at 03:54

## 2018-09-05 RX ADMIN — INFLUENZA VIRUS VACCINE 0.5 MILLILITER(S): 15; 15; 15; 15 SUSPENSION INTRAMUSCULAR at 20:02

## 2018-09-05 RX ADMIN — Medication 600 MILLIGRAM(S): at 07:34

## 2018-09-05 RX ADMIN — Medication 1 MILLIGRAM(S): at 18:09

## 2018-09-05 RX ADMIN — Medication 1 TABLET(S): at 12:19

## 2018-09-05 NOTE — PROGRESS NOTE ADULT - ATTENDING COMMENTS
post  day # 2  IDDM  hx of preeclampsia    no complaints  vitals and exam wnl  f/s 100s - 190 (after lunch)    labs reviewed    Plan  optimize glucose control  other routine care  discussed breastfeeding
37 wks  IDDM suboptimal control  good fetal movement  exam / nst wnl  f/s much improved after modification of insulin regimen yesterday  plan for IOl tomorrow

## 2018-09-05 NOTE — ADVANCED PRACTICE NURSE CONSULT - RECOMMEDATIONS
continue diabetes self management education  please consider lowering lantus to 15 units qhs and humalog to 5 before meals p  please add humalog moderate ss prior to meals and low @ bedtime

## 2018-09-05 NOTE — PROGRESS NOTE ADULT - SUBJECTIVE AND OBJECTIVE BOX
23y  s/p pCS at 37 2/7 wks gestation PPD# 2.   Patient seen and examined at bedside, no acute overnight events.   Patient is ambulating, +eating, +PO hydration, +voiding, +Flatus, -BM, +Formula feeding, - Breast feeding and pain is well controlled.  Denies headache, SOB, fever, chills and calf pain.    VS:   Vital Signs Last 24 Hrs  T(C): 36.4 (04 Sep 2018 19:13), Max: 36.9 (04 Sep 2018 09:49)  T(F): 97.5 (04 Sep 2018 19:13), Max: 98.5 (04 Sep 2018 09:49)  HR: 94 (04 Sep 2018 22:00) (91 - 103)  BP: 124/83 (04 Sep 2018 22:00) (121/79 - 140/88)  BP(mean): --  RR: 20 (04 Sep 2018 19:13) (15 - 20)  SpO2: 100% (04 Sep 2018 07:35) (100% - 100%)    Physical Exam:  General: NAD  Lungs: CTAB, no wheeze, ronchi or rales.   Abdomen: +BS, soft, ND, minimally tender, Fundus firm at level of umbilicus, incision c/d/i   Pelvic: Minimal lochia  Ext: No cyanosis, edema or calf tenderness.     Labs:                        9.5    10.5  )-----------( 137      ( 04 Sep 2018 01:17 )             29.2         Medication:  MEDICATIONS  (STANDING):  dextrose 5%. 1000 milliLiter(s) (50 mL/Hr) IV Continuous <Continuous>  dextrose 5%. 1000 milliLiter(s) (50 mL/Hr) IV Continuous <Continuous>  dextrose 5%. 1000 milliLiter(s) (50 mL/Hr) IV Continuous <Continuous>  dextrose 50% Injectable 12.5 Gram(s) IV Push once  dextrose 50% Injectable 25 Gram(s) IV Push once  dextrose 50% Injectable 25 Gram(s) IV Push once  dextrose 50% Injectable 12.5 Gram(s) IV Push once  dextrose 50% Injectable 25 Gram(s) IV Push once  dextrose 50% Injectable 25 Gram(s) IV Push once  dextrose 50% Injectable 12.5 Gram(s) IV Push once  dextrose 50% Injectable 25 Gram(s) IV Push once  dextrose 50% Injectable 25 Gram(s) IV Push once  folic acid 1 milliGRAM(s) Oral daily  insulin detemir injectable (LEVEMIR) 20 Unit(s) SubCutaneous at bedtime  insulin lispro Injectable (HumaLOG) 10 Unit(s) SubCutaneous three times a day before meals  prenatal multivitamin 1 Tablet(s) Oral daily    MEDICATIONS  (PRN):  dextrose 40% Gel 15 Gram(s) Oral once PRN Blood Glucose LESS THAN 70 milliGRAM(s)/deciliter  diphenhydrAMINE   Capsule 25 milliGRAM(s) Oral every 4 hours PRN Pruritus  glucagon  Injectable 1 milliGRAM(s) IntraMuscular once PRN Glucose LESS THAN 70 milligrams/deciliter  glucagon  Injectable 1 milliGRAM(s) IntraMuscular once PRN Glucose LESS THAN 70 milligrams/deciliter  glucagon  Injectable 1 milliGRAM(s) IntraMuscular once PRN Glucose LESS THAN 60 milligrams/deciliter  HYDROmorphone  Injectable 1 milliGRAM(s) IV Push every 3 hours PRN Severe Pain (7 - 10)  ibuprofen  Tablet. 600 milliGRAM(s) Oral every 6 hours PRN Mild Pain (1 - 3), Moderate Pain (4 - 6)  nalbuphine Injectable 2.5 milliGRAM(s) IV Push every 6 hours PRN Pruritus  naloxone Injectable 0.1 milliGRAM(s) IV Push every 3 minutes PRN For ANY of the following changes in patient status:  A. RR LESS THAN 10 breaths per minute, B. Oxygen saturation LESS THAN 90%, C. Sedation score of 6  ondansetron Injectable 4 milliGRAM(s) IV Push every 6 hours PRN Nausea  oxyCODONE    5 mG/acetaminophen 325 mG 1 Tablet(s) Oral every 4 hours PRN Moderate Pain (4 - 6)  oxyCODONE    5 mG/acetaminophen 325 mG 2 Tablet(s) Oral every 6 hours PRN Severe Pain (7 - 10) 23y  s/p pCS at 37 2/7 wks gestation PPD# 2.   Patient seen and examined at bedside, no acute overnight events.   Patient is ambulating, +eating, +PO hydration, +voiding, +Flatus, -BM, +Formula feeding, - Breast feeding and pain is well controlled.  Denies headache, SOB, fever, chills and calf pain.    VS:   Vital Signs Last 24 Hrs  T(C): 36.4 (04 Sep 2018 19:13), Max: 36.9 (04 Sep 2018 09:49)  T(F): 97.5 (04 Sep 2018 19:13), Max: 98.5 (04 Sep 2018 09:49)  HR: 94 (04 Sep 2018 22:00) (91 - 103)  BP: 124/83 (04 Sep 2018 22:00) (121/79 - 140/88)  RR: 20 (04 Sep 2018 19:13) (15 - 20)  SpO2: 100% (04 Sep 2018 07:35) (100% - 100%)    Physical Exam:  General: NAD  Lungs: CTAB, no wheeze, ronchi or rales.   Abdomen: +BS, soft, ND, minimally tender, Fundus firm at level of umbilicus, incision c/d/i   Pelvic: Minimal lochia  Ext: No cyanosis, edema or calf tenderness.     Labs:                        9.5    10.5  )-----------( 137      ( 04 Sep 2018 01:17 )             29.2         Medication:  MEDICATIONS  (STANDING):  dextrose 5%. 1000 milliLiter(s) (50 mL/Hr) IV Continuous <Continuous>  dextrose 5%. 1000 milliLiter(s) (50 mL/Hr) IV Continuous <Continuous>  dextrose 5%. 1000 milliLiter(s) (50 mL/Hr) IV Continuous <Continuous>  dextrose 50% Injectable 12.5 Gram(s) IV Push once  dextrose 50% Injectable 25 Gram(s) IV Push once  dextrose 50% Injectable 25 Gram(s) IV Push once  dextrose 50% Injectable 12.5 Gram(s) IV Push once  dextrose 50% Injectable 25 Gram(s) IV Push once  dextrose 50% Injectable 25 Gram(s) IV Push once  dextrose 50% Injectable 12.5 Gram(s) IV Push once  dextrose 50% Injectable 25 Gram(s) IV Push once  dextrose 50% Injectable 25 Gram(s) IV Push once  folic acid 1 milliGRAM(s) Oral daily  insulin detemir injectable (LEVEMIR) 20 Unit(s) SubCutaneous at bedtime  insulin lispro Injectable (HumaLOG) 10 Unit(s) SubCutaneous three times a day before meals  prenatal multivitamin 1 Tablet(s) Oral daily    MEDICATIONS  (PRN):  dextrose 40% Gel 15 Gram(s) Oral once PRN Blood Glucose LESS THAN 70 milliGRAM(s)/deciliter  diphenhydrAMINE   Capsule 25 milliGRAM(s) Oral every 4 hours PRN Pruritus  glucagon  Injectable 1 milliGRAM(s) IntraMuscular once PRN Glucose LESS THAN 70 milligrams/deciliter  glucagon  Injectable 1 milliGRAM(s) IntraMuscular once PRN Glucose LESS THAN 70 milligrams/deciliter  glucagon  Injectable 1 milliGRAM(s) IntraMuscular once PRN Glucose LESS THAN 60 milligrams/deciliter  HYDROmorphone  Injectable 1 milliGRAM(s) IV Push every 3 hours PRN Severe Pain (7 - 10)  ibuprofen  Tablet. 600 milliGRAM(s) Oral every 6 hours PRN Mild Pain (1 - 3), Moderate Pain (4 - 6)  nalbuphine Injectable 2.5 milliGRAM(s) IV Push every 6 hours PRN Pruritus  naloxone Injectable 0.1 milliGRAM(s) IV Push every 3 minutes PRN For ANY of the following changes in patient status:  A. RR LESS THAN 10 breaths per minute, B. Oxygen saturation LESS THAN 90%, C. Sedation score of 6  ondansetron Injectable 4 milliGRAM(s) IV Push every 6 hours PRN Nausea  oxyCODONE    5 mG/acetaminophen 325 mG 1 Tablet(s) Oral every 4 hours PRN Moderate Pain (4 - 6)  oxyCODONE    5 mG/acetaminophen 325 mG 2 Tablet(s) Oral every 6 hours PRN Severe Pain (7 - 10)

## 2018-09-05 NOTE — PROGRESS NOTE ADULT - ASSESSMENT
23y  s/p pCS at 37 2/7 wks gestation PPD# 2 w/ preeclampsia 23y  s/p pCS at 37 2/7 wks gestation PPD# 2     1.  Delivery  -Patient is feeling well, hemodynamically stable  -Tolerating PO and voiding  -C/w pain management PRN  -Encourage breast feeding and ambulation  -C/w routine postpartum care   -F/u labs 23y  s/p pCS at 37 2/7 wks gestation PPD# 2     1.  Delivery  -Patient is feeling well, hemodynamically stable  -Tolerating PO and voiding  -C/w pain management PRN  -Encourage breast feeding and ambulation  -C/w routine postpartum care   -F/u labs     2. Diabetes Mellitus - type 1  -Suboptimal blood glucose control  -C/w insulin regimen and adjust if needed  -C/w diabetic diet and education

## 2018-09-06 VITALS
TEMPERATURE: 99 F | SYSTOLIC BLOOD PRESSURE: 119 MMHG | RESPIRATION RATE: 20 BRPM | DIASTOLIC BLOOD PRESSURE: 79 MMHG | HEART RATE: 107 BPM

## 2018-09-06 LAB — GLUCOSE BLDC GLUCOMTR-MCNC: 109 MG/DL — HIGH (ref 70–99)

## 2018-09-06 PROCEDURE — 87086 URINE CULTURE/COLONY COUNT: CPT

## 2018-09-06 PROCEDURE — 87081 CULTURE SCREEN ONLY: CPT

## 2018-09-06 PROCEDURE — 86901 BLOOD TYPING SEROLOGIC RH(D): CPT

## 2018-09-06 PROCEDURE — 86780 TREPONEMA PALLIDUM: CPT

## 2018-09-06 PROCEDURE — 82803 BLOOD GASES ANY COMBINATION: CPT

## 2018-09-06 PROCEDURE — 85384 FIBRINOGEN ACTIVITY: CPT

## 2018-09-06 PROCEDURE — 86703 HIV-1/HIV-2 1 RESULT ANTBDY: CPT

## 2018-09-06 PROCEDURE — 85730 THROMBOPLASTIN TIME PARTIAL: CPT

## 2018-09-06 PROCEDURE — 81003 URINALYSIS AUTO W/O SCOPE: CPT

## 2018-09-06 PROCEDURE — 90686 IIV4 VACC NO PRSV 0.5 ML IM: CPT

## 2018-09-06 PROCEDURE — 83615 LACTATE (LD) (LDH) ENZYME: CPT

## 2018-09-06 PROCEDURE — 82962 GLUCOSE BLOOD TEST: CPT

## 2018-09-06 PROCEDURE — 90715 TDAP VACCINE 7 YRS/> IM: CPT

## 2018-09-06 PROCEDURE — 83036 HEMOGLOBIN GLYCOSYLATED A1C: CPT

## 2018-09-06 PROCEDURE — 87591 N.GONORRHOEAE DNA AMP PROB: CPT

## 2018-09-06 PROCEDURE — 87491 CHLMYD TRACH DNA AMP PROBE: CPT

## 2018-09-06 PROCEDURE — 80307 DRUG TEST PRSMV CHEM ANLYZR: CPT

## 2018-09-06 PROCEDURE — 83735 ASSAY OF MAGNESIUM: CPT

## 2018-09-06 PROCEDURE — 36415 COLL VENOUS BLD VENIPUNCTURE: CPT

## 2018-09-06 PROCEDURE — 85610 PROTHROMBIN TIME: CPT

## 2018-09-06 PROCEDURE — 84550 ASSAY OF BLOOD/URIC ACID: CPT

## 2018-09-06 PROCEDURE — 86850 RBC ANTIBODY SCREEN: CPT

## 2018-09-06 PROCEDURE — 84156 ASSAY OF PROTEIN URINE: CPT

## 2018-09-06 PROCEDURE — 80053 COMPREHEN METABOLIC PANEL: CPT

## 2018-09-06 PROCEDURE — 85027 COMPLETE CBC AUTOMATED: CPT

## 2018-09-06 PROCEDURE — 86900 BLOOD TYPING SEROLOGIC ABO: CPT

## 2018-09-06 RX ORDER — ENOXAPARIN SODIUM 100 MG/ML
15 INJECTION SUBCUTANEOUS
Qty: 1 | Refills: 0 | OUTPATIENT
Start: 2018-09-06

## 2018-09-06 RX ORDER — INSULIN LISPRO 100/ML
5 VIAL (ML) SUBCUTANEOUS
Qty: 1 | Refills: 0 | OUTPATIENT
Start: 2018-09-06

## 2018-09-06 RX ADMIN — Medication 600 MILLIGRAM(S): at 01:09

## 2018-09-06 RX ADMIN — OXYCODONE AND ACETAMINOPHEN 2 TABLET(S): 5; 325 TABLET ORAL at 01:02

## 2018-09-06 RX ADMIN — OXYCODONE AND ACETAMINOPHEN 2 TABLET(S): 5; 325 TABLET ORAL at 05:00

## 2018-09-06 RX ADMIN — Medication 600 MILLIGRAM(S): at 10:31

## 2018-09-06 RX ADMIN — Medication 5 UNIT(S): at 08:49

## 2018-09-06 RX ADMIN — Medication 600 MILLIGRAM(S): at 11:31

## 2018-09-06 RX ADMIN — OXYCODONE AND ACETAMINOPHEN 2 TABLET(S): 5; 325 TABLET ORAL at 12:00

## 2018-09-06 RX ADMIN — Medication 600 MILLIGRAM(S): at 01:39

## 2018-09-06 RX ADMIN — OXYCODONE AND ACETAMINOPHEN 2 TABLET(S): 5; 325 TABLET ORAL at 05:30

## 2018-09-06 RX ADMIN — OXYCODONE AND ACETAMINOPHEN 2 TABLET(S): 5; 325 TABLET ORAL at 11:30

## 2018-09-06 NOTE — PROGRESS NOTE ADULT - ASSESSMENT
23y  s/p pCS at 37 2/7 wks gestation PPD# 3     1.  Delivery  -Patient is feeling well, hemodynamically stable  -Tolerating PO and voiding  -C/w pain management PRN  -Encourage breast feeding and ambulation  -C/w routine postpartum care   -Will be d/c today    2. Diabetes Mellitus - type 1  -Suboptimal blood glucose control  -C/w insulin regimen and adjust if needed  -C/w diabetic diet and education

## 2018-09-06 NOTE — PROGRESS NOTE ADULT - SUBJECTIVE AND OBJECTIVE BOX
23y  s/p pCS at 37 2/7 wks gestation PPD# 3.  Patient seen and examined at bedside, no acute overnight events.   Patient is ambulating, +eating, +PO hydration, +voiding, +Flatus, -BM, +Formula feeding, - Breast feeding and pain is well controlled.  Denies headache, SOB, fever, chills and calf pain.    VS:   Vital Signs Last 24 Hrs  T(C): 36.6 (05 Sep 2018 20:17), Max: 36.6 (05 Sep 2018 20:17)  T(F): 97.9 (05 Sep 2018 20:17), Max: 97.9 (05 Sep 2018 20:17)  HR: 97 (05 Sep 2018 20:17) (97 - 100)  BP: 136/87 (05 Sep 2018 20:17) (121/75 - 136/87)  RR: 20 (05 Sep 2018 20:17) (20 - 20)    Physical Exam:  General: NAD  Lungs: CTAB, no wheeze, ronchi or rales.   Breast: mild tenderness b/l, no discharge   Abdomen: +BS, soft, ND, minimally tender, Fundus firm at level of umbilicus, incision d/c/i  Pelvic: Minimal lochia  Ext: No cyanosis, edema or calf tenderness.     Labs:                    9.5    10.5  )-----------( 137      ( 04 Sep 2018 01:17 )             29.2       Medication:  MEDICATIONS  (STANDING):  dextrose 5%. 1000 milliLiter(s) (50 mL/Hr) IV Continuous <Continuous>  dextrose 5%. 1000 milliLiter(s) (50 mL/Hr) IV Continuous <Continuous>  dextrose 5%. 1000 milliLiter(s) (50 mL/Hr) IV Continuous <Continuous>  dextrose 50% Injectable 12.5 Gram(s) IV Push once  dextrose 50% Injectable 25 Gram(s) IV Push once  dextrose 50% Injectable 25 Gram(s) IV Push once  dextrose 50% Injectable 12.5 Gram(s) IV Push once  dextrose 50% Injectable 25 Gram(s) IV Push once  dextrose 50% Injectable 25 Gram(s) IV Push once  dextrose 50% Injectable 12.5 Gram(s) IV Push once  dextrose 50% Injectable 25 Gram(s) IV Push once  dextrose 50% Injectable 25 Gram(s) IV Push once  folic acid 1 milliGRAM(s) Oral daily  insulin glargine Injectable (LANTUS) 15 Unit(s) SubCutaneous at bedtime  insulin lispro (HumaLOG) corrective regimen sliding scale   SubCutaneous three times a day before meals  insulin lispro (HumaLOG) corrective regimen sliding scale   SubCutaneous at bedtime  insulin lispro Injectable (HumaLOG) 5 Unit(s) SubCutaneous three times a day before meals  prenatal multivitamin 1 Tablet(s) Oral daily    MEDICATIONS  (PRN):  dextrose 40% Gel 15 Gram(s) Oral once PRN Blood Glucose LESS THAN 70 milliGRAM(s)/deciliter  diphenhydrAMINE   Capsule 25 milliGRAM(s) Oral every 4 hours PRN Pruritus  glucagon  Injectable 1 milliGRAM(s) IntraMuscular once PRN Glucose LESS THAN 70 milligrams/deciliter  glucagon  Injectable 1 milliGRAM(s) IntraMuscular once PRN Glucose LESS THAN 70 milligrams/deciliter  glucagon  Injectable 1 milliGRAM(s) IntraMuscular once PRN Glucose LESS THAN 60 milligrams/deciliter  HYDROmorphone  Injectable 1 milliGRAM(s) IV Push every 3 hours PRN Severe Pain (7 - 10)  ibuprofen  Tablet. 600 milliGRAM(s) Oral every 6 hours PRN Mild Pain (1 - 3), Moderate Pain (4 - 6)  nalbuphine Injectable 2.5 milliGRAM(s) IV Push every 6 hours PRN Pruritus  naloxone Injectable 0.1 milliGRAM(s) IV Push every 3 minutes PRN For ANY of the following changes in patient status:  A. RR LESS THAN 10 breaths per minute, B. Oxygen saturation LESS THAN 90%, C. Sedation score of 6  ondansetron Injectable 4 milliGRAM(s) IV Push every 6 hours PRN Nausea  oxyCODONE    5 mG/acetaminophen 325 mG 1 Tablet(s) Oral every 4 hours PRN Moderate Pain (4 - 6)  oxyCODONE    5 mG/acetaminophen 325 mG 2 Tablet(s) Oral every 6 hours PRN Severe Pain (7 - 10)

## 2018-09-07 ENCOUNTER — APPOINTMENT (OUTPATIENT)
Dept: ANTEPARTUM | Facility: CLINIC | Age: 23
End: 2018-09-07

## 2018-09-11 ENCOUNTER — APPOINTMENT (OUTPATIENT)
Dept: ANTEPARTUM | Facility: CLINIC | Age: 23
End: 2018-09-11

## 2018-09-14 ENCOUNTER — APPOINTMENT (OUTPATIENT)
Dept: ANTEPARTUM | Facility: CLINIC | Age: 23
End: 2018-09-14

## 2018-09-18 ENCOUNTER — APPOINTMENT (OUTPATIENT)
Dept: ANTEPARTUM | Facility: CLINIC | Age: 23
End: 2018-09-18

## 2018-09-21 ENCOUNTER — APPOINTMENT (OUTPATIENT)
Dept: ANTEPARTUM | Facility: CLINIC | Age: 23
End: 2018-09-21

## 2018-11-09 ENCOUNTER — RX RENEWAL (OUTPATIENT)
Age: 23
End: 2018-11-09

## 2018-11-09 RX ORDER — BLOOD SUGAR DIAGNOSTIC
STRIP MISCELLANEOUS
Qty: 200 | Refills: 0 | Status: ACTIVE | COMMUNITY
Start: 2018-02-20 | End: 1900-01-01

## 2018-11-09 RX ORDER — INSULIN DETEMIR 100 [IU]/ML
100 INJECTION, SOLUTION SUBCUTANEOUS
Qty: 30 | Refills: 0 | Status: ACTIVE | COMMUNITY
Start: 2018-02-05 | End: 1900-01-01

## 2018-11-09 RX ORDER — LANCETS 33 GAUGE
31G X 5 MM EACH MISCELLANEOUS
Qty: 100 | Refills: 0 | Status: ACTIVE | COMMUNITY
Start: 2018-02-05 | End: 1900-01-01

## 2020-07-22 ENCOUNTER — RX RENEWAL (OUTPATIENT)
Age: 25
End: 2020-07-22

## 2023-01-06 NOTE — PROGRESS NOTE ADULT - PROBLEM SELECTOR PROBLEM 3
1.  Please return to clinic at your next scheduled visit.  Contact the [facility] ([phone]) or **Mercedes, Medical Assistant at [place] Office directly at [phone] at least 24 hours prior in the event you need to cancel.**    2. Should you want to get in touch with your provider, NATE Moya, please contact MY Medical Assistant, Mercedes, directly at [phone].  Recommend saving Mercedes's direct number in phone as this is the PREFERRED & EASIEST way to get in contact with your provider.  Please leave a voice mail if you do not get an answer and she will return your call within 24 hrs. You will NOT be able to contact provider on [facility], as Behavioral Health Providers are restricted. YOU MUST CALL [phone]    If you need to speak with the on call provider after hours or on weekends, please Contact the [facility] ([phone]) and staff will be able to page the provider on call directly.        3, MEDICATION REFILLS:  PLEASE CALL THE PHARMACY TO REQUEST ALL MEDICATION REFILLS TO ENSURE YOU ARE RECEIVING YOUR MEDICATIONS IN A TIMELY MANNER.    IF YOU USE AN AUTOMATED SERVICE AT THE PHARMACY FOR REFILLS AND ARE TOLD THERE ARE \"NO REFILLS REMAINING\"   PLEASE CALL THE PHARMACY & SPEAK TO A LIVE PERSON TO VERIFY IT IS THE MOST UP TO DATE PRESCRIPTION ON FILE.    All new prescriptions will have a different number, therefore, if you were given refills for a medication today or at last visit it will not have the same number as the previous prescription.       4.  In the event you have personal crisis, contact the following crisis numbers: Suicide Prevention Hotline 1-126.416.9344 or *988, [facility] Helpline 3-284-524-AYALA; [facility] Emergency Room [phone]; text HELLO to 723978; or 946.      5. We would appreciate your feedback, please scan the QRS code on the back of your appointment card (or see below) and complete a brief survey.  [place] location is still not available, so  please click \"[place]\" location.  Thank you      SPECIFIC RECOMMENDATIONS:     1.      Medications discussed at this encounter:                   - no changes     2.      Psychotherapy recommendations: Declined     3.     Return to clinic: 3 months    Please arrive at least 15 minutes before your scheduled appointment time to complete check in process.      IF you are scheduled for a [employer] VIDEO visit, PLEASE ANSWER YOUR PHONE WHEN OFFICE CALLS PRIOR TO VISIT TO COMPLETE THE CHECK IN PROCESS, EVEN IF THE E-CHECK IN WAS COMPLETED.     If you would like to log on to [employer] and complete the \"E-Check IN\" prior to your visit, please do so, this will speed up the check in process.  If you are due for questionnaires, you will find those on [employer] as well, please try to complete prior to your scheduled appointment.           Anomaly of heart of fetus affecting pregnancy, antepartum, single or unspecified fetus